# Patient Record
Sex: MALE | Race: WHITE | Employment: FULL TIME | URBAN - METROPOLITAN AREA
[De-identification: names, ages, dates, MRNs, and addresses within clinical notes are randomized per-mention and may not be internally consistent; named-entity substitution may affect disease eponyms.]

---

## 2017-08-18 ENCOUNTER — HOSPITAL ENCOUNTER (OUTPATIENT)
Age: 57
Setting detail: SPECIMEN
Discharge: HOME | End: 2017-08-18
Payer: COMMERCIAL

## 2017-08-22 LAB — SURGICAL PATHOLOGY REPORT: NORMAL

## 2017-10-10 ENCOUNTER — HOSPITAL ENCOUNTER (OUTPATIENT)
Dept: PHYSICAL THERAPY | Facility: CLINIC | Age: 57
Setting detail: THERAPIES SERIES
Discharge: HOME | End: 2017-10-10
Payer: COMMERCIAL

## 2017-10-10 PROCEDURE — 97161 PT EVAL LOW COMPLEX 20 MIN: CPT

## 2017-10-10 PROCEDURE — 97110 THERAPEUTIC EXERCISES: CPT

## 2017-10-10 NOTE — CONSULTS
[] Hari Malave        Outpatient Physical                Therapy       [address]       Phone: [phone]       Fax: [phone] [] [facility] at [address] [facility]       Phone: [phone]       Fax: [phone] [x] Alo. [address] [place] [address]    [address] [place] [place] [address]     Phone: [phone]     Fax:  [phone]     Physical Therapy Spine Evaluation    Date:  10/10/2017  Patient: Jennie Taylor  : 1960  MRN: [id number]  Physician: Keane Holter  Insurance: [facility]  Medical Diagnosis: Dorsalgia, Backache   Rehab Codes: M54.9  Onset Date: 17  Next 's appt.: None at this time    Subjective:   CC: Patient states that on 17 patient was at work when he lifted a 50 lb panel and felt pain posterior, right upper quadrant. He continues to work full time with no restrictions. Patient went to see primary physician a week later on 10/4/17  and was prescribed mm relaxer (patient reports Tizanadine), taken PRN. Patient does report tick bite July 3, 2017, labs taken to r/o Lymes disease, results pending.       PMHx: [] Unremarkable [] Diabetes [] HTN  [] Pacemaker   [] MI/Heart Problems [] Cancer [] Arthritis [x] Other: heart murmur, kidney cyst, mild OA hips, tennis elbow right              [] Refer to full medical chart  In EPIC   Tests: [] X-Ray: [] MRI:  [] Other:    Medications: [x] Refer to full medical record [] None [] Other:  Allergies:      [x] Refer to full medical record [] None [] Other:    Function:  Hand Dominance  [x] Right  [] Left  Working:  [x] Normal Duty  [] Light Duty  [] Off D/T Condition  [] Retired  [] Not Employed                  []  Disability  [] Other:           Return to work:   Job/ADL Description:  Pain:  [x] Yes  [] No Location: Right thoracic spine and covering scapula   Pain Rating: (0-10 scale)2-4/10  Pain altered Tx:  [] Yes  [] No  Action:  Symptoms:  [x] previously given. Treatment Plan:  [x] Therapeutic Exercise    [] Modalities:  [] Therapeutic Activity    [] Ultrasound  [] Electrical Stimulation  [] Gait Training     [x]Massage       [] Lumbar/Cervical Traction  [] Neuromuscular Re-education [] Cold/hotpack [] Iontophoresis: 4 mg/mL  [x] Instruction in HEP             Dexamethasone Sodium  [] Manual Therapy             Phosphate 40-80 mAmin  [] Aquatic Therapy   [] Other:    []  Medication allergies reviewed for use of    Dexamethasone Sodium Phosphate 4mg/ml     with iontophoresis treatments. Pt is not allergic. Frequency:  1 x/week 2 weeks for total 2 follow up visits    Todays Treatment:  Modalities: None  Precautions:  NA  Exercises:  Exercise Reps/ Time Weight/ Level Comments        ER  1 x 10 Red Tband Issued IE 10/10/17        Horizontal ABD 1 x10 Red Tband         Prone abd 90 with ER 10          Chin retraction 10   3 sec hold        Cervical AROM  10  Rotation c chin retraction        Levator Stretch   30 sec hold x 3        UT stretch   30 sec hold x 3        Pec corner stretch   30 sec hold x 3                              Other:  Postural education and cues to maintain posture with sitting and standing therex    Specific Instructions for next treatment:    Treatment Charges: Mins Units   [] Evaluation       [x]  Low       []  Moderate       []  High 40 1   []  Modalities     []  Ther Exercise 25 2   []  Manual Therapy     []  Ther Activities     []  Aquatics     []  Vasocompression     []  Other       TOTAL TREATMENT TIME: 65 minutes    Time in:1100      Time out: 1205    Electronically signed by: Venecia Davies PT        Physician Signature:________________________________Date:__________________  By signing above or cosigning this note, I have reviewed this plan of care and certify a need for medically necessary rehabilitation services.      *PLEASE SIGN ABOVE AND FAX BACK ALL PAGES*

## 2017-10-20 ENCOUNTER — HOSPITAL ENCOUNTER (OUTPATIENT)
Dept: PHYSICAL THERAPY | Facility: CLINIC | Age: 57
Setting detail: THERAPIES SERIES
Discharge: HOME | End: 2017-10-20
Payer: COMMERCIAL

## 2017-10-20 PROCEDURE — 97110 THERAPEUTIC EXERCISES: CPT

## 2017-10-20 NOTE — FLOWSHEET NOTE
[x] Alo. [address] [facility] [employer]  [address] [place] [address]   Phone: [phone]   Fax:  [phone]     Physical Therapy Daily Treatment Note    Date:  10/20/2017  Patient Name:  Noemi Chapa    :  1960  MRN: [id number]  Physician: Lucinda Ortega                                 Insurance: [facility]  Medical Diagnosis: Dorsalgia, Backache                                     Rehab Codes: M54.9  Onset Date: 17                                   Next 's appt.: None at this time    Visit# / total visits: 2/  Cancels/No Shows:     Subjective:    Pain:  [x] Yes  [] No Location: Neck  Pain Rating: (0-10 scale) 0/10  Pain altered Tx:  [x] No  [] Yes  Action:  Comments: Pt arrived noting cont soreness and stiffness, mostly settled in the neck. Also notes difficulty lifting R LE at times, mostly in the morning. Objective:  Modalities:   Precautions:  NA  Exercises:  Exercise Reps/ Time Weight/ Level Comments   Aerodyne 5'           Prone      Retraction 20x     Depression 20x                 ER  2x10 Red    Horizontal ABD 2x10 Red    Ext 2x10 Red    Rows 2x10 Red    Prone abd 90 with ER 10       Scap Squeezes 10x10\"     Chin retraction 10   3 sec hold   Cervical AROM  10   Rotation c chin retraction   Levator Stretch     30 sec hold x 3   UT stretch     30 sec hold x 3   Pec corner stretch     30 sec hold x 3                       Other: Postural education and cues to maintain posture with sitting and standing therex    Specific Instructions for next treatment:    Treatment Charges: Mins Units   []  Modalities     [x]  Ther Exercise 30 2   []  Manual Therapy     []  Ther Activities     []  Aquatics     []  Vasocompression     []  Other     Total Treatment time 45 2       Assessment: [x] Progressing toward goals. [] No change. [x] Other: Cont with exs per log, cont to stress importance of proper posture. Sig tightness noted with UT stretch and cervical rotation.  Progressed strengthening

## 2017-10-27 ENCOUNTER — HOSPITAL ENCOUNTER (OUTPATIENT)
Dept: PHYSICAL THERAPY | Facility: CLINIC | Age: 57
Setting detail: THERAPIES SERIES
Discharge: HOME | End: 2017-10-27
Payer: COMMERCIAL

## 2017-10-30 ENCOUNTER — HOSPITAL ENCOUNTER (OUTPATIENT)
Dept: PHYSICAL THERAPY | Facility: CLINIC | Age: 57
Setting detail: THERAPIES SERIES
Discharge: HOME | End: 2017-10-30
Payer: COMMERCIAL

## 2017-10-30 PROCEDURE — 97110 THERAPEUTIC EXERCISES: CPT

## 2017-10-30 NOTE — FLOWSHEET NOTE
[x] [facility]. [address] [place] [address] [email or web address]  [address] [place] [address]   Phone: [phone]   Fax:  [phone]     Physical Therapy Daily Treatment Note    Date:  10/30/2017  Patient Name:  Flaca Maxwell    :  1960  MRN: [id number]  Physician: Margarito Hudson                                 Insurance: [facility]  Medical Diagnosis: Dorsalgia, Backache                                     Rehab Codes: M54.9  Onset Date: 17                                   Next 's appt.: None at this time    Visit# / total visits: 3/  Cancels/No Shows:     Subjective:    Pain:  [] Yes  [x] No Location: Neck  Pain Rating: (0-10 scale) 0/10  Pain altered Tx:  [x] No  [] Yes  Action:  Comments: Pt arrived noting no c/o pn/soreness just cont stiffness. Pt requested demonstration of exercise machines that would be at gym. Pt with no gym membership at this time but plans on joining soon. Objective:  Modalities:   Precautions:  NA  Exercises:  Exercise Reps/ Time Weight/ Level Comments   Aerodyne 5' x          Prone      Retraction 20x     Depression 20x                 ER  2x10 green x    Horizontal ABD 2x10 Green x    Ext 2x10 Green x    Rows 2x10 Green x    Prone abd 90 with ER 10       Scap Squeezes 10x10\" x    Chin retraction 10  x 3 sec hold   Cervical AROM  10   Rotation c chin retraction   Levator Stretch    x 30 sec hold x 3   UT stretch    x 30 sec hold x 3   Pec corner stretch    x 30 sec hold x 3                       Other: Postural education and cues to maintain posture with sitting and standing therex. Instructed pt how to utilize various exercise equipment in the gym for use outside of clinic. Specific Instructions for next treatment:    Treatment Charges: Mins Units   []  Modalities     [x]  Ther Exercise 30 2   []  Manual Therapy     []  Ther Activities     []  Aquatics     []  Vasocompression     []  Other     Total Treatment time 45 2       Assessment: [x] Progressing toward goals.     [] No change. [x] Other: Cont with exs per log, progressed to green tband this visit with increased fatigue and min cueing needed for tech. Reviewed UE machine for gym use with good comprehension. Pt plans to continue with HEP at this time. Reviewed program, issued additional tband for HEP. STG: (to be met in 2 follow up visits/ treatments)  1. ? Pain:  Patient reports pain 1/10  2. ? ROM:  Patient cervical AROM WNL  3. ? Strength:  ER 5/5  4. ? Function:  Oswestry = 24%   5. Independent with Home Exercise Programs  6. Demonstrate Knowledge of fall prevention      Patient goals:  Strengthen back muscles. Pt. Education:  [x] Yes  [] No  [x] Reviewed Prior HEP/Ed  Method of Education: [x] Verbal  [] Demo  [x] Written  Comprehension of Education:  [x] Verbalizes understanding. [] Demonstrates understanding. [x] Needs review. [] Demonstrates/verbalizes HEP/Ed previously given. Plan: [x] Continue per plan of care.    [] Other:      Time In: 1400            Time Out: 5398    Electronically signed by:  Faye Copeland PTA

## 2017-11-07 ENCOUNTER — HOSPITAL ENCOUNTER (EMERGENCY)
Age: 57
Discharge: HOME | End: 2017-11-07
Attending: SPECIALIST
Payer: COMMERCIAL

## 2017-11-07 VITALS
RESPIRATION RATE: 16 BRPM | HEART RATE: 64 BPM | SYSTOLIC BLOOD PRESSURE: 131 MMHG | OXYGEN SATURATION: 98 % | DIASTOLIC BLOOD PRESSURE: 83 MMHG | TEMPERATURE: 97.7 F

## 2017-11-07 DIAGNOSIS — S01.01XA LACERATION OF SCALP, INITIAL ENCOUNTER: Primary | ICD-10-CM

## 2017-11-07 DIAGNOSIS — S09.90XA CLOSED HEAD INJURY, INITIAL ENCOUNTER: ICD-10-CM

## 2017-11-07 PROCEDURE — 99283 EMERGENCY DEPT VISIT LOW MDM: CPT

## 2017-11-07 PROCEDURE — 12001 RPR S/N/AX/GEN/TRNK 2.5CM/<: CPT

## 2017-11-07 PROCEDURE — 2500000003 HC RX 250 WO HCPCS: Performed by: SPECIALIST

## 2017-11-07 RX ORDER — LIDOCAINE HYDROCHLORIDE 10 MG/ML
20 INJECTION, SOLUTION INFILTRATION; PERINEURAL ONCE
Status: COMPLETED | OUTPATIENT
Start: 2017-11-07 | End: 2017-11-07

## 2017-11-07 RX ADMIN — LIDOCAINE HYDROCHLORIDE 20 ML: 10 INJECTION, SOLUTION INFILTRATION; PERINEURAL at 15:24

## 2017-11-07 ASSESSMENT — PAIN SCALES - GENERAL
PAINLEVEL_OUTOF10: 1
PAINLEVEL_OUTOF10: 1

## 2017-11-07 ASSESSMENT — PAIN DESCRIPTION - ONSET: ONSET: SUDDEN

## 2017-11-07 ASSESSMENT — PAIN DESCRIPTION - FREQUENCY: FREQUENCY: CONTINUOUS

## 2017-11-07 ASSESSMENT — PAIN DESCRIPTION - DESCRIPTORS: DESCRIPTORS: ACHING

## 2017-11-07 ASSESSMENT — ENCOUNTER SYMPTOMS: NAUSEA: 0

## 2017-11-07 ASSESSMENT — PAIN DESCRIPTION - PAIN TYPE: TYPE: ACUTE PAIN

## 2017-11-07 ASSESSMENT — PAIN DESCRIPTION - LOCATION: LOCATION: HEAD

## 2017-11-07 NOTE — ED PROVIDER NOTES
[facility]  eMERGENCY dEPARTMENT eNCOUnter      Pt Name: Ilda Damian  MRN: [id number]  [place] 1960  Date of evaluation: 11/7/17      CHIEF COMPLAINT       Chief Complaint   Patient presents with    Head Injury     Patient was hit in the head with a cast iron pipe at work today. No LOC         HISTORY OF PRESENT ILLNESS    Ilda Damian is a 61 y.o. male who presents to the emergency department after patient sustained a scalp laceration on the top of the scalp at about 120 p.m. this afternoon. Patient states he was helping a coworker when a cast iron pipe fell from approximately 6 feet height causing laceration on the top of the scalp today. Nose to loss of consciousness. The iron pipe vague about 2-3 pounds. No history of loss of consciousness and patient denies any headache, dizziness, neck pain, nausea, visual disturbances, tingling, numbness or weakness in any of the extremities. Last tetanus injection is up-to-date. He denies any other injuries. Patient does not take any blood thinners. REVIEW OF SYSTEMS       Review of Systems   Gastrointestinal: Negative for nausea. Musculoskeletal: Negative for neck pain. Skin: Positive for wound. Neurological: Negative for dizziness, weakness, numbness and headaches. All other systems reviewed and are negative. PAST MEDICAL HISTORY    has a past medical history of Head injury. SURGICAL HISTORY      has a past surgical history that includes Tonsillectomy. CURRENT MEDICATIONS       Previous Medications    ASPIRIN PO    Take  by mouth. ALLERGIES     has No Known Allergies. FAMILY HISTORY     has no family status information on file. family history is not on file. SOCIAL HISTORY      reports that he has never smoked. He does not have any smokeless tobacco history on file. He reports that he drinks alcohol. He reports that he does not use drugs.     PHYSICAL EXAM     INITIAL VITALS:  oral temperature is 97.7 °F Temp: 97.7 °F (36.5 °C), Pulse: 64, Resp: 16    Orders Placed This Encounter   Medications    lidocaine 1 % injection 20 mL         During emergency department course, the scalp laceration was stapled by myself. Patient tolerated the procedure well. Please see procedure note. Plan is to discharge the patient with wound care and head injury instructions, staples removal in 5-7 days, take Tylenol and/or ibuprofen as needed, follow up with PCP, return if worse. CONSULTS:  None    PROCEDURES:  Laceration Repair Procedure Note    Indication: Laceration    Procedure: The patient was placed in the appropriate position and anesthesia around the laceration was obtained by infiltration using 1% Lidocaine without epinephrine. The area was then cleansed with betadine and draped in a sterile fashion. The laceration was closed with staples. There were no additional lacerations requiring repair. The wound area was then dressed with bacitracin. Total repaired wound length: 2.5 cm. Other Items: Staple count: 5    The patient tolerated the procedure well. Complications: None        FINAL IMPRESSION      1. Laceration of scalp, initial encounter    2. Closed head injury, initial encounter          DISPOSITION/PLAN       PATIENT REFERRED TO:  [address]  [address]  [phone]    Call in 5 days  For staples removal    [facility] ED  [address] [place] [address]  [phone]    If symptoms worsen      DISCHARGE MEDICATIONS:  New Prescriptions    No medications on file       (Please note that portions of this note were completed with a voice recognition program.  Efforts were made to edit the dictations but occasionally words are mis-transcribed.)    Ramirez MD, F.A.C.E.P.   Attending Emergency Medicine Physician         Kwesi Rosado MD  11/07/17 0876

## 2017-11-13 ENCOUNTER — HOSPITAL ENCOUNTER (EMERGENCY)
Age: 57
Discharge: HOME | End: 2017-11-13
Attending: EMERGENCY MEDICINE
Payer: COMMERCIAL

## 2017-11-13 VITALS
TEMPERATURE: 97.9 F | HEART RATE: 63 BPM | RESPIRATION RATE: 15 BRPM | BODY MASS INDEX: 22.15 KG/M2 | SYSTOLIC BLOOD PRESSURE: 133 MMHG | OXYGEN SATURATION: 100 % | WEIGHT: 150 LBS | DIASTOLIC BLOOD PRESSURE: 73 MMHG

## 2017-11-13 DIAGNOSIS — Z48.02: Primary | ICD-10-CM

## 2017-11-13 PROCEDURE — 99282 EMERGENCY DEPT VISIT SF MDM: CPT

## 2017-11-13 NOTE — ED PROVIDER NOTES
[facility] ED  [address] [facility] [address]  Phone: [phone]  Fax: [phone]      eMERGENCY dEPARTMENT eNCOUnter      Pt Name: Arabella Johnson  MRN: [id number]  [place] 1960  Date of evaluation: 11/13/17      CHIEF COMPLAINT:  Chief Complaint   Patient presents with    Suture / Staple Removal     5 staples placed last Tuesday       HISTORY OF PRESENT ILLNESS    Arabella Johnson is a 61 y.o. male who presents for SUTURE AND/OR STAPLE REMOVAL:    Location/Symptom:    Staple removal evaluation  Timing/Onset:   6 days  Context/Setting:   Staple removal after large metal pipe elbow hit his head. Alexander placed here w/o complication and he denies any concerns since their placement. No other complaints. Quality:   No pain  Duration:   resolved  Modifying Factors:   none  Severity:   none    Nursing Notes were reviewed. REVIEW OF SYSTEMS       Constitutional: Denies recent fever, chills. Eyes: No visual changes. Neck: No neck pain. Respiratory: Denies recent shortness of breath. Cardiac:  Denies recent chest pain. GI:  No nausea. No vomiting. Denies abdominal pain/diarrhea. Musculoskeletal: Denies focal weakness. Neurologic:  Denies headache or focal weakness. Skin:   Suture/staple removal    Negative in 10 essential Systems except as mentioned above and in the HPI. PAST MEDICAL HISTORY   PMH:  has a past medical history of Head injury. Surgical History:  has a past surgical history that includes Tonsillectomy. Social History:  reports that he has never smoked. He has never used smokeless tobacco. He reports that he drinks alcohol. He reports that he does not use drugs. Family History: None  Psychiatric History: None    Allergies:has No Known Allergies.       PHYSICAL EXAM     INITIAL VITALS: /73   Pulse 63   Temp 97.9 °F (36.6 °C) (Oral)   Resp 15   Wt 68 kg (150 lb)   SpO2 100%   BMI 22.15 kg/m²     Constitutional:  Well developed   Eyes: Pupils equal  HENT:  Atraumatic, left parietal scalp laceration with 5 staples in place, c/d/i and well-healed. external ears normal, nose normal  Respiratory:  Clear to auscultation bilaterally with good air exchange  Cardiovascular:  RRR with normal S1 and S2  Musculoskeletal:  Normal to inspection  Integument:   As above  Neurologic:  Alert & oriented x 3, no focal deficits noted       DIAGNOSTIC RESULTS     EKG: All EKG's are interpreted by the Emergency Department Physician who either signs or Co-signs this chart in the absence of a cardiologist.  Not indicated    RADIOLOGY:   Reviewed the radiologist:  No orders to display     Not indicated      LABS:  Labs Reviewed - No data to display  Not indicated    EMERGENCY DEPARTMENT COURSE:         No orders of the defined types were placed in this encounter. CONSULTS:  None      Suture/ Staple Removal Procedure Note  Indication: Wound healed    Procedure: The patient was placed in the appropriate position and the staples were removed without difficulty. Other items: the wound appears well healed and Staple count: 5    The patient tolerated the procedure well. Complications: None      FINAL IMPRESSION      1.  Encounter for removal of staples          DISPOSITION/PLAN:  DISPOSITION Decision to Discharge      PATIENT REFERRED TO:  Toro Barba , [address]  [address]  [phone]    Go to   for persistence or worsening of symptoms      DISCHARGE MEDICATIONS:  New Prescriptions    No medications on file       (Please note that portions of this note were completed with a voice recognition program.  Efforts were made to edit the dictations but occasionally words are mis-transcribed.)    ABEL Pizarro PA-C  11/13/17 9172

## 2017-11-13 NOTE — ED NOTES
Patient into ER for staple removal that was placed last Tuesday. Denies any additional problems, complaints or complications  CBE=70  A&0 X4  X5 stapled noted, no redness, inflammation, drainage or discharge.     Ambulatory to room   Nondistressed  GCS=15  VS stable    Call light within reach  Updated on plan of care and processes  Denies complaints at this time  Will continue to monitor       Sonia Murcia RN  11/13/17 0823

## 2017-11-13 NOTE — ED PROVIDER NOTES
smoked. He has never used smokeless tobacco. He reports that he drinks alcohol. He reports that he does not use drugs. PHYSICAL EXAM     INITIAL VITALS:  weight is 68 kg (150 lb). His oral temperature is 97.9 °F (36.6 °C). His blood pressure is 133/73 and his pulse is 63. His respiration is 15 and oxygen saturation is 100%. Healing scalp wound with staples in place. No evidence of infection. DIAGNOSTIC RESULTS       EMERGENCY DEPARTMENT COURSE:   Vitals:    Vitals:    11/13/17 1313   BP: 133/73   Pulse: 63   Resp: 15   Temp: 97.9 °F (36.6 °C)   TempSrc: Oral   SpO2: 100%   Weight: 68 kg (150 lb)     -------------------------  BP: 133/73, Temp: 97.9 °F (36.6 °C), Pulse: 63, Resp: 15      PERTINENT ATTENDING PHYSICIAN COMMENTS:    Staples were removed by the PA. He tolerated it well. He has been given supportive care infections will be discharged. (Please note that portions of this note were completed with a voice recognition program.  Efforts were made to edit the dictations but occasionally words are mis-transcribed.)    Santos MD, F.A.C.E.P.   Attending Emergency Medicine Physician        Ganesh Bradley MD  11/13/17 8914

## 2018-01-10 ENCOUNTER — INITIAL CONSULT (OUTPATIENT)
Dept: ONCOLOGY | Age: 58
End: 2018-01-10
Payer: COMMERCIAL

## 2018-01-10 VITALS
HEART RATE: 62 BPM | DIASTOLIC BLOOD PRESSURE: 82 MMHG | BODY MASS INDEX: 22.19 KG/M2 | SYSTOLIC BLOOD PRESSURE: 126 MMHG | RESPIRATION RATE: 16 BRPM | HEIGHT: 69 IN | TEMPERATURE: 98.6 F | WEIGHT: 149.8 LBS

## 2018-01-10 DIAGNOSIS — D64.9 ANEMIA, UNSPECIFIED TYPE: Primary | ICD-10-CM

## 2018-01-10 PROCEDURE — 99201 HC NEW PT, E/M LEVEL 1: CPT

## 2018-01-10 PROCEDURE — 99204 OFFICE O/P NEW MOD 45 MIN: CPT | Performed by: INTERNAL MEDICINE

## 2018-01-10 RX ORDER — MESALAMINE 1.2 G/1
TABLET, DELAYED RELEASE ORAL
COMMUNITY
Start: 2017-12-14 | End: 2018-02-07 | Stop reason: ALTCHOICE

## 2018-01-10 RX ORDER — INDOMETHACIN 50 MG/1
50 CAPSULE ORAL 2 TIMES DAILY WITH MEALS
COMMUNITY
End: 2022-10-17

## 2018-01-10 RX ORDER — TIZANIDINE 4 MG/1
TABLET ORAL
COMMUNITY
Start: 2017-10-04 | End: 2018-02-07 | Stop reason: ALTCHOICE

## 2018-01-10 NOTE — PROGRESS NOTES
processed at  [facility], [address] [facility] [place] are 6 tan-brown  fragments ranging from 0.2 to 0.4 cm in greatest dimension and 1.7 x  0.2 x 0.2 cm in aggregate. Entirely submitted 1cs. Received at [facility]  are 2 slides labeled \"[id number], MARLON TORRES. \"  tm      Microscopic Description  1. Sections of duodenal mucosa show preservation of the villous  architecture. The lamina propria contains the usual lymphocytes and  plasma cells. There is no evidence of neutrophilic inflammation,  granuloma formation or dysplasia. 2. Sections of gastric mucosa show neutrophils in the lamina propria  and gastric pits. There is  a background lymphocytic and plasma cell  infiltrate. There is no evidence of intestinal metaplasia or  dysplasia. The provided Giemsa stain appears negative for  Helicobacter organisms (control stains appropriately). 3.  Sections of colonic mucosa show irregular crypt spacing and a  brisk lymphoplasmacytic infiltrate in the lamina propria. Occasional  neutrophils are present in the lamina propria but there is no evidence  of well-developed crypt abscess formation. The provided trichrome  stain requested by Dr. Danuta Garcia and performed at [address] lab  appears negative for a thickened subepithelial collagen band. Controls are adequate. 4. Sections of colonic mucosa show slightly irregularly spaced glands  with increased lymphocytes and plasma cells in the lamina propria. Scattered neutrophils and eosinophils are found in the lamina propria  and focally in the surface epithelium. There is no evidence of  well-developed crypt abscess formation. Focal mucin extravasation   with muciphages are noted, but true granulomas are not identified. The trichrome stain requested by Dr. Danuta Garcia and performed at [address] lab appears negative for thickened subepithelial collagen  band. Controls stain appropriately. IMPRESSION:     1.  Anemia, unspecified type arthralgias injection site tenderness superficial thrombophlebitis it's extremely rare to have an extravasation which results in permanent hyperpigmentation of the skin I've seen 3/17 approximately years a practice. Gen. iron is very successful at completing iron state. Given new diagnosis of colitis I will order M05, folic acid, zinc copper vitamin D which are common to be reduced and in the malabsorption/colitis type inflammatory state. [place] on the order today as a baseline as well. I like to monitor CBC and ferritin acute 2 week intervals. I've given him approximately a 6 week follow-up. He understands that always in the differential of anemia includes the potential for hemolysis, distribution will causation such as hepatosplenomegaly, or really concerning is always the potential for a primary bone marrow process in that case a bone marrow biopsy particular if he were to fail to respond and become nonanemic hemoglobin was told not to improve 2 levels that are normal than certainly further evaluation to include potential bone marrow biopsy would consider but this seems highly unlikely scenario given the above clinical known issues. All questions were answered to his satisfaction. Thank you Dr. Taylor Harrell for allowing me to consult and participate in this very nice gentleman's care. Herlinda Boss D.O.       Electronically signed by Herlinda Boss DO on 1/10/2018 at 6:00 PM

## 2018-02-07 ENCOUNTER — OFFICE VISIT (OUTPATIENT)
Dept: ONCOLOGY | Age: 58
End: 2018-02-07
Payer: COMMERCIAL

## 2018-02-07 VITALS
DIASTOLIC BLOOD PRESSURE: 89 MMHG | BODY MASS INDEX: 22.28 KG/M2 | TEMPERATURE: 97.4 F | SYSTOLIC BLOOD PRESSURE: 134 MMHG | HEART RATE: 59 BPM | WEIGHT: 150.9 LBS | RESPIRATION RATE: 16 BRPM

## 2018-02-07 DIAGNOSIS — D64.9 ANEMIA, UNSPECIFIED TYPE: ICD-10-CM

## 2018-02-07 PROCEDURE — 99214 OFFICE O/P EST MOD 30 MIN: CPT | Performed by: INTERNAL MEDICINE

## 2018-02-07 PROCEDURE — 99211 OFF/OP EST MAY X REQ PHY/QHP: CPT

## 2018-02-07 NOTE — PROGRESS NOTES
PROGRESS NOTE    PCP: Sammy Isbell MD  Referring Provider: No ref. provider found    VISIT DIAGNOSIS:  The encounter diagnosis was Anemia, unspecified type. PAST MEDICAL HISTORY:      Diagnosis Date    Anemia     Head injury        PAST SURGICAL HISTORY:      Procedure Laterality Date    CYST REMOVAL      TONSILLECTOMY      VASECTOMY         CURRENT MEDICATIONS:   Current Outpatient Prescriptions   Medication Sig Dispense Refill    indomethacin (INDOCIN) 50 MG capsule Take 50 mg by mouth 2 times daily (with meals)       Multiple Vitamins-Minerals (THERAPEUTIC VITAMINS/MINERALS PO) Take by mouth 1 every other day      ASPIRIN PO Take by mouth        No current facility-administered medications for this visit. SUBJECTIVE:  Huyen Tiwari is a very pleasant 61 y.o. male who is Here for continued evaluation regard to iron deficiency anemia. Feels dramatically better. Specifically endurance energy. He notes that he has some rare diarrhea and abdominal pain and discomfort. Describes being change from mesalamine to Pentasa. He's tolerated both per poorly and probably the Pentasa the worst.  He has upcoming additional appointment with GI but this is an middle March. He notes no ongoing bleeding or easy bruisability. Specifically no epistaxis, blood in stools black or tarry stools hematuria hemoptysis or other that he is aware of. Denies fevers chills night sweats. He notes no chest pain orthopnea PND peripheral edema. He has a pill bottle of oral iron 325 mg ferrous sulfate with them and he is taking 1 pill daily. PHYSICAL EXAM:   Vitals:    02/07/18 1411   BP: 134/89   Pulse: 59   Resp: 16   Temp: 97.4 °F (36.3 °C)       Physical Exam   Constitutional: He is oriented to person, place, and time. He appears well-developed. No distress. HENT:   Head: Normocephalic and atraumatic.    Nose: Nose normal.   Mouth/Throat: Oropharynx is clear and moist.   Eyes: Conjunctivae are normal. Pupils COULD FIT THE ENDOSCOPIC PICTURE. Yaritza Coelho M.D.  **Electronically Signed Out**         jet/8/22/2 017      Clinical Information  Pre-op Diagnosis:  ANEMIA, R/O CELIAC   Operative Findings:  DUODENAL BX; GASTRIC BX; RANDOM COLON BX RIGHT;  LEFT COLON  Clin. Req.:  H&E; GIEMSA; TRICHROME    Source of Specimen  1: DUODENAL BX  2: GASTRIC BX  3: RANDOM COLON BX  4: LEFT COLON    Gross Description  1. \"DUODENAL BX\" Received in formalin, submitted and processed at [address], [facility] are 5 tan-brown  fragments ranging from 0.2 to 0.6 cm in greatest dimension and 1.9 x  0.2 x 0.2 cm in aggregate. Entirely submitted 1cs. Received at [facility]  is 1 slide labeled \"[id number], Nicholas rUban. \"  2. \"GASTRIC BX\" Received in formalin, submitted and processed at [address] are 4 tan-brown  fragments ranging from 0.2 to 0.4 cm in greatest dimension and 1.2 x  0.3 x 0.2 cm in aggregate. Entirely submitted 1cs. Received at [facility]  are 2 slides labeled \"[id number], Nicholas Urban. \"  3. \"RANDOM COLON RIGHT\" Received in for trip, submitted and processed  at [address] are 6 tan-brown  fragments ranging from 0.2 to 0.3 cm in greatest dimension and 1.5 x  0.2 x 0.2 cm in aggregate. Entirely submitted 1cs. Received at [facility]  are 2 slides labeled \"[id number], Velflores Urban. \"  4. \"LEFT COLON BX\" Received in formalin, submitted and processed at  [address] are 6 tan-brown  fragments ranging from 0.2 to 0.4 cm in greatest dimension and 1.7 x  0.2 x 0.2 cm in aggregate. Entirely submitted 1cs. Received at [facility]  are 2 slides labeled \"[id number], MARLON TORRES. \"  tm      Microscopic Description  1. Sections of duodenal mucosa show preservation of the villous  architecture. The lamina propria contains the usual lymphocytes and  plasma cells.   There is no evidence of I've discussed that I checked zinc, copper, J78, folic acid, and vitamin D as these can be low additionally with malabsorption issues. These were all normal levels. He understands that he likely has grown/ulcerative colitis -type picture that he's being treated for by GI. I recommended surveillance of iron stores and CBC at monthly intervals and a three-month follow-up. Recommended he stay on the oral iron ferrous sulfate 325 mg 1 by mouth daily at this time. Plan to see him back in 3 months. As always have discussed that if he should have noticed any shortness of breath, chest pain, blood in stools black or tarry stools drop in energy or other ongoing issues or concerns that he should certainly contact myself or Dr. Alyx Domínguez in further workup would be expedited. He verbalized understanding and was agreeable to the plan of care.       Electronically signed by Domingo Adhikari DO on 2/7/2018 at 5:36 PM

## 2018-03-02 ENCOUNTER — HOSPITAL ENCOUNTER (OUTPATIENT)
Age: 58
Discharge: HOME | End: 2018-03-02
Payer: COMMERCIAL

## 2018-03-02 DIAGNOSIS — D64.9 ANEMIA, UNSPECIFIED TYPE: ICD-10-CM

## 2018-03-02 LAB
FERRITIN: 29 UG/L (ref 30–400)
HCT VFR BLD CALC: 41 % (ref 41–53)
HEMOGLOBIN: 13.3 G/DL (ref 13.5–17.5)
MCH RBC QN AUTO: 28.2 PG (ref 26–34)
MCHC RBC AUTO-ENTMCNC: 32.4 G/DL (ref 31–37)
MCV RBC AUTO: 87.2 FL (ref 80–100)
NRBC AUTOMATED: ABNORMAL PER 100 WBC
PDW BLD-RTO: 14.8 % (ref 12.5–15.4)
PLATELET # BLD: 266 K/UL (ref 140–450)
PMV BLD AUTO: 8.7 FL (ref 6–12)
PROSTATE SPECIFIC ANTIGEN: 0.79 UG/L
RBC # BLD: 4.7 M/UL (ref 4.5–5.9)
WBC # BLD: 6.7 K/UL (ref 3.5–11)

## 2018-03-02 PROCEDURE — 82728 ASSAY OF FERRITIN: CPT

## 2018-03-02 PROCEDURE — 36415 COLL VENOUS BLD VENIPUNCTURE: CPT

## 2018-03-02 PROCEDURE — 85027 COMPLETE CBC AUTOMATED: CPT

## 2018-03-02 PROCEDURE — 84153 ASSAY OF PSA TOTAL: CPT

## 2018-04-06 ENCOUNTER — HOSPITAL ENCOUNTER (OUTPATIENT)
Age: 58
Discharge: HOME | End: 2018-04-06
Payer: COMMERCIAL

## 2018-04-06 DIAGNOSIS — D64.9 ANEMIA, UNSPECIFIED TYPE: ICD-10-CM

## 2018-04-06 LAB
FERRITIN: 29 UG/L (ref 30–400)
HCT VFR BLD CALC: 41.3 % (ref 41–53)
HEMOGLOBIN: 13.7 G/DL (ref 13.5–17.5)
MCH RBC QN AUTO: 29 PG (ref 26–34)
MCHC RBC AUTO-ENTMCNC: 33.2 G/DL (ref 31–37)
MCV RBC AUTO: 87.2 FL (ref 80–100)
NRBC AUTOMATED: NORMAL PER 100 WBC
PDW BLD-RTO: 15 % (ref 12.5–15.4)
PLATELET # BLD: 267 K/UL (ref 140–450)
PMV BLD AUTO: 8.6 FL (ref 6–12)
RBC # BLD: 4.74 M/UL (ref 4.5–5.9)
WBC # BLD: 7.9 K/UL (ref 3.5–11)

## 2018-04-06 PROCEDURE — 85027 COMPLETE CBC AUTOMATED: CPT

## 2018-04-06 PROCEDURE — 36415 COLL VENOUS BLD VENIPUNCTURE: CPT

## 2018-04-06 PROCEDURE — 82728 ASSAY OF FERRITIN: CPT

## 2018-05-07 ENCOUNTER — HOSPITAL ENCOUNTER (OUTPATIENT)
Age: 58
Discharge: HOME | End: 2018-05-07
Payer: COMMERCIAL

## 2018-05-07 DIAGNOSIS — D64.9 ANEMIA, UNSPECIFIED TYPE: ICD-10-CM

## 2018-05-07 LAB
FERRITIN: 37 UG/L (ref 30–400)
HCT VFR BLD CALC: 44.4 % (ref 41–53)
HEMOGLOBIN: 14.6 G/DL (ref 13.5–17.5)
MCH RBC QN AUTO: 28.8 PG (ref 26–34)
MCHC RBC AUTO-ENTMCNC: 32.9 G/DL (ref 31–37)
MCV RBC AUTO: 87.7 FL (ref 80–100)
NRBC AUTOMATED: ABNORMAL PER 100 WBC
PDW BLD-RTO: 15.5 % (ref 12.5–15.4)
PLATELET # BLD: 244 K/UL (ref 140–450)
PMV BLD AUTO: 8.9 FL (ref 6–12)
RBC # BLD: 5.06 M/UL (ref 4.5–5.9)
WBC # BLD: 7.9 K/UL (ref 3.5–11)

## 2018-05-07 PROCEDURE — 85027 COMPLETE CBC AUTOMATED: CPT

## 2018-05-07 PROCEDURE — 36415 COLL VENOUS BLD VENIPUNCTURE: CPT

## 2018-05-07 PROCEDURE — 82728 ASSAY OF FERRITIN: CPT

## 2018-05-09 ENCOUNTER — OFFICE VISIT (OUTPATIENT)
Dept: ONCOLOGY | Age: 58
End: 2018-05-09
Payer: COMMERCIAL

## 2018-05-09 VITALS
RESPIRATION RATE: 16 BRPM | SYSTOLIC BLOOD PRESSURE: 120 MMHG | TEMPERATURE: 98.2 F | WEIGHT: 151 LBS | BODY MASS INDEX: 22.3 KG/M2 | DIASTOLIC BLOOD PRESSURE: 77 MMHG | HEART RATE: 65 BPM

## 2018-05-09 DIAGNOSIS — D64.9 ANEMIA, UNSPECIFIED TYPE: ICD-10-CM

## 2018-05-09 PROCEDURE — 99213 OFFICE O/P EST LOW 20 MIN: CPT | Performed by: INTERNAL MEDICINE

## 2018-05-09 PROCEDURE — 99211 OFF/OP EST MAY X REQ PHY/QHP: CPT | Performed by: INTERNAL MEDICINE

## 2018-05-09 RX ORDER — FERROUS SULFATE 325(65) MG
325 TABLET ORAL EVERY OTHER DAY
COMMUNITY

## 2018-11-12 ENCOUNTER — HOSPITAL ENCOUNTER (OUTPATIENT)
Age: 58
Discharge: HOME | End: 2018-11-12
Payer: COMMERCIAL

## 2018-11-12 DIAGNOSIS — D64.9 ANEMIA, UNSPECIFIED TYPE: ICD-10-CM

## 2018-11-12 LAB
FERRITIN: 39 UG/L (ref 30–400)
HCT VFR BLD CALC: 42.1 % (ref 41–53)
HEMOGLOBIN: 13.7 G/DL (ref 13.5–17.5)
MCH RBC QN AUTO: 29.4 PG (ref 26–34)
MCHC RBC AUTO-ENTMCNC: 32.5 G/DL (ref 31–37)
MCV RBC AUTO: 90.7 FL (ref 80–100)
NRBC AUTOMATED: NORMAL PER 100 WBC
PDW BLD-RTO: 13.7 % (ref 12.5–15.4)
PLATELET # BLD: 289 K/UL (ref 140–450)
PMV BLD AUTO: 8.6 FL (ref 6–12)
RBC # BLD: 4.64 M/UL (ref 4.5–5.9)
WBC # BLD: 6.3 K/UL (ref 3.5–11)

## 2018-11-12 PROCEDURE — 82525 ASSAY OF COPPER: CPT

## 2018-11-12 PROCEDURE — 82728 ASSAY OF FERRITIN: CPT

## 2018-11-12 PROCEDURE — 84630 ASSAY OF ZINC: CPT

## 2018-11-12 PROCEDURE — 82652 VIT D 1 25-DIHYDROXY: CPT

## 2018-11-12 PROCEDURE — 36415 COLL VENOUS BLD VENIPUNCTURE: CPT

## 2018-11-12 PROCEDURE — 85027 COMPLETE CBC AUTOMATED: CPT

## 2018-11-14 ENCOUNTER — OFFICE VISIT (OUTPATIENT)
Dept: ONCOLOGY | Age: 58
End: 2018-11-14
Payer: COMMERCIAL

## 2018-11-14 VITALS
WEIGHT: 153 LBS | BODY MASS INDEX: 22.59 KG/M2 | HEART RATE: 59 BPM | SYSTOLIC BLOOD PRESSURE: 137 MMHG | DIASTOLIC BLOOD PRESSURE: 88 MMHG | TEMPERATURE: 97.6 F

## 2018-11-14 DIAGNOSIS — K52.9 COLITIS: Primary | ICD-10-CM

## 2018-11-14 PROCEDURE — 99213 OFFICE O/P EST LOW 20 MIN: CPT | Performed by: INTERNAL MEDICINE

## 2018-11-15 LAB — VITAMIN D 1,25-DIHYDROXY: 63.4 PG/ML (ref 19.9–79.3)

## 2018-11-16 LAB
COPPER: 122 UG/DL (ref 70–140)
ZINC: 66 UG/DL (ref 60–120)

## 2019-01-08 ENCOUNTER — HOSPITAL ENCOUNTER (OUTPATIENT)
Age: 59
Discharge: HOME | End: 2019-01-08
Payer: COMMERCIAL

## 2019-01-08 DIAGNOSIS — K52.9 COLITIS: ICD-10-CM

## 2019-01-08 LAB
ABSOLUTE EOS #: 0.1 K/UL (ref 0–0.4)
ABSOLUTE IMMATURE GRANULOCYTE: ABNORMAL K/UL (ref 0–0.3)
ABSOLUTE LYMPH #: 2 K/UL (ref 1–4.8)
ABSOLUTE MONO #: 0.7 K/UL (ref 0.1–1.2)
BASOPHILS # BLD: 0 % (ref 0–2)
BASOPHILS ABSOLUTE: 0 K/UL (ref 0–0.2)
DIFFERENTIAL TYPE: ABNORMAL
EOSINOPHILS RELATIVE PERCENT: 1 % (ref 1–4)
HCT VFR BLD CALC: 40.5 % (ref 41–53)
HEMOGLOBIN: 13.3 G/DL (ref 13.5–17.5)
IMMATURE GRANULOCYTES: ABNORMAL %
LYMPHOCYTES # BLD: 24 % (ref 24–44)
MCH RBC QN AUTO: 28.7 PG (ref 26–34)
MCHC RBC AUTO-ENTMCNC: 32.9 G/DL (ref 31–37)
MCV RBC AUTO: 87.1 FL (ref 80–100)
MONOCYTES # BLD: 9 % (ref 2–11)
NRBC AUTOMATED: ABNORMAL PER 100 WBC
PDW BLD-RTO: 13.9 % (ref 12.5–15.4)
PLATELET # BLD: 319 K/UL (ref 140–450)
PLATELET ESTIMATE: ABNORMAL
PMV BLD AUTO: 8.3 FL (ref 6–12)
RBC # BLD: 4.65 M/UL (ref 4.5–5.9)
RBC # BLD: ABNORMAL 10*6/UL
SEG NEUTROPHILS: 66 % (ref 36–66)
SEGMENTED NEUTROPHILS ABSOLUTE COUNT: 5.3 K/UL (ref 1.8–7.7)
WBC # BLD: 8.1 K/UL (ref 3.5–11)
WBC # BLD: ABNORMAL 10*3/UL

## 2019-01-08 PROCEDURE — 85025 COMPLETE CBC W/AUTO DIFF WBC: CPT

## 2019-01-08 PROCEDURE — 36415 COLL VENOUS BLD VENIPUNCTURE: CPT

## 2019-01-08 PROCEDURE — 82728 ASSAY OF FERRITIN: CPT

## 2019-01-09 LAB — FERRITIN: 32 UG/L (ref 30–400)

## 2019-02-25 ENCOUNTER — HOSPITAL ENCOUNTER (OUTPATIENT)
Age: 59
Discharge: HOME | End: 2019-02-25
Payer: COMMERCIAL

## 2019-02-25 DIAGNOSIS — K52.9 COLITIS: ICD-10-CM

## 2019-02-25 LAB
ABSOLUTE EOS #: 0.05 K/UL (ref 0–0.4)
ABSOLUTE IMMATURE GRANULOCYTE: ABNORMAL K/UL (ref 0–0.3)
ABSOLUTE LYMPH #: 1.84 K/UL (ref 1–4.8)
ABSOLUTE MONO #: 0.5 K/UL (ref 0.1–1.2)
BASOPHILS # BLD: 0 % (ref 0–2)
BASOPHILS ABSOLUTE: 0.03 K/UL (ref 0–0.2)
DIFFERENTIAL TYPE: ABNORMAL
EOSINOPHILS RELATIVE PERCENT: 1 % (ref 1–4)
HCT VFR BLD CALC: 40.4 % (ref 41–53)
HEMOGLOBIN: 13 G/DL (ref 13.5–17.5)
IMMATURE GRANULOCYTES: ABNORMAL %
LYMPHOCYTES # BLD: 25 % (ref 24–44)
MCH RBC QN AUTO: 28.9 PG (ref 26–34)
MCHC RBC AUTO-ENTMCNC: 32.2 G/DL (ref 31–37)
MCV RBC AUTO: 89.8 FL (ref 80–100)
MONOCYTES # BLD: 7 % (ref 2–11)
NRBC AUTOMATED: ABNORMAL PER 100 WBC
PDW BLD-RTO: 14.3 % (ref 12.5–15.4)
PLATELET # BLD: 282 K/UL (ref 140–450)
PLATELET ESTIMATE: ABNORMAL
PMV BLD AUTO: 10.3 FL (ref 8–14)
RBC # BLD: 4.5 M/UL (ref 4.5–5.9)
RBC # BLD: ABNORMAL 10*6/UL
SEG NEUTROPHILS: 67 % (ref 36–66)
SEGMENTED NEUTROPHILS ABSOLUTE COUNT: 5.08 K/UL (ref 1.8–7.7)
WBC # BLD: 7.5 K/UL (ref 3.5–11)
WBC # BLD: ABNORMAL 10*3/UL

## 2019-02-25 PROCEDURE — 36415 COLL VENOUS BLD VENIPUNCTURE: CPT

## 2019-02-25 PROCEDURE — 82728 ASSAY OF FERRITIN: CPT

## 2019-02-25 PROCEDURE — 85025 COMPLETE CBC W/AUTO DIFF WBC: CPT

## 2019-02-26 LAB — FERRITIN: 36 UG/L (ref 30–400)

## 2019-05-13 ENCOUNTER — HOSPITAL ENCOUNTER (OUTPATIENT)
Age: 59
Discharge: HOME | End: 2019-05-13
Payer: COMMERCIAL

## 2019-05-13 DIAGNOSIS — K52.9 COLITIS: ICD-10-CM

## 2019-05-13 LAB
ABSOLUTE EOS #: 0.1 K/UL (ref 0–0.4)
ABSOLUTE IMMATURE GRANULOCYTE: ABNORMAL K/UL (ref 0–0.3)
ABSOLUTE LYMPH #: 1.8 K/UL (ref 1–4.8)
ABSOLUTE MONO #: 0.5 K/UL (ref 0.1–1.2)
ALBUMIN SERPL-MCNC: 4.2 G/DL (ref 3.5–5.2)
ALBUMIN/GLOBULIN RATIO: 1.1 (ref 1–2.5)
ALP BLD-CCNC: 87 U/L (ref 40–129)
ALT SERPL-CCNC: 13 U/L (ref 5–41)
ANION GAP SERPL CALCULATED.3IONS-SCNC: 12 MMOL/L (ref 9–17)
AST SERPL-CCNC: 20 U/L
BASOPHILS # BLD: 0 % (ref 0–2)
BASOPHILS ABSOLUTE: 0 K/UL (ref 0–0.2)
BILIRUB SERPL-MCNC: 0.26 MG/DL (ref 0.3–1.2)
BUN BLDV-MCNC: 11 MG/DL (ref 6–20)
BUN/CREAT BLD: ABNORMAL (ref 9–20)
CALCIUM SERPL-MCNC: 9.6 MG/DL (ref 8.6–10.4)
CHLORIDE BLD-SCNC: 101 MMOL/L (ref 98–107)
CO2: 29 MMOL/L (ref 20–31)
CREAT SERPL-MCNC: 1.08 MG/DL (ref 0.7–1.2)
DIFFERENTIAL TYPE: ABNORMAL
EOSINOPHILS RELATIVE PERCENT: 1 % (ref 1–4)
FERRITIN: 43 UG/L (ref 30–400)
GFR AFRICAN AMERICAN: >60 ML/MIN
GFR NON-AFRICAN AMERICAN: >60 ML/MIN
GFR SERPL CREATININE-BSD FRML MDRD: ABNORMAL ML/MIN/{1.73_M2}
GFR SERPL CREATININE-BSD FRML MDRD: ABNORMAL ML/MIN/{1.73_M2}
GLUCOSE BLD-MCNC: 77 MG/DL (ref 70–99)
HCT VFR BLD CALC: 42.6 % (ref 41–53)
HEMOGLOBIN: 14.5 G/DL (ref 13.5–17.5)
IMMATURE GRANULOCYTES: ABNORMAL %
LYMPHOCYTES # BLD: 21 % (ref 24–44)
MCH RBC QN AUTO: 30.1 PG (ref 26–34)
MCHC RBC AUTO-ENTMCNC: 34.1 G/DL (ref 31–37)
MCV RBC AUTO: 88.2 FL (ref 80–100)
MONOCYTES # BLD: 5 % (ref 2–11)
NRBC AUTOMATED: ABNORMAL PER 100 WBC
PDW BLD-RTO: 14.2 % (ref 12.5–15.4)
PLATELET # BLD: 320 K/UL (ref 140–450)
PLATELET ESTIMATE: ABNORMAL
PMV BLD AUTO: 8.4 FL (ref 6–12)
POTASSIUM SERPL-SCNC: 4.4 MMOL/L (ref 3.7–5.3)
RBC # BLD: 4.83 M/UL (ref 4.5–5.9)
RBC # BLD: ABNORMAL 10*6/UL
SEG NEUTROPHILS: 73 % (ref 36–66)
SEGMENTED NEUTROPHILS ABSOLUTE COUNT: 6.5 K/UL (ref 1.8–7.7)
SODIUM BLD-SCNC: 142 MMOL/L (ref 135–144)
TOTAL PROTEIN: 7.9 G/DL (ref 6.4–8.3)
VITAMIN B-12: 915 PG/ML (ref 232–1245)
VITAMIN D 25-HYDROXY: 22 NG/ML (ref 30–100)
WBC # BLD: 8.9 K/UL (ref 3.5–11)
WBC # BLD: ABNORMAL 10*3/UL

## 2019-05-13 PROCEDURE — 36415 COLL VENOUS BLD VENIPUNCTURE: CPT

## 2019-05-13 PROCEDURE — 84630 ASSAY OF ZINC: CPT

## 2019-05-13 PROCEDURE — 82525 ASSAY OF COPPER: CPT

## 2019-05-13 PROCEDURE — 82728 ASSAY OF FERRITIN: CPT

## 2019-05-13 PROCEDURE — 82607 VITAMIN B-12: CPT

## 2019-05-13 PROCEDURE — 85025 COMPLETE CBC W/AUTO DIFF WBC: CPT

## 2019-05-13 PROCEDURE — 82306 VITAMIN D 25 HYDROXY: CPT

## 2019-05-13 PROCEDURE — 80053 COMPREHEN METABOLIC PANEL: CPT

## 2019-05-15 ENCOUNTER — OFFICE VISIT (OUTPATIENT)
Dept: ONCOLOGY | Age: 59
End: 2019-05-15
Payer: COMMERCIAL

## 2019-05-15 VITALS
TEMPERATURE: 97.4 F | DIASTOLIC BLOOD PRESSURE: 77 MMHG | HEART RATE: 65 BPM | RESPIRATION RATE: 16 BRPM | WEIGHT: 151.6 LBS | BODY MASS INDEX: 22.39 KG/M2 | SYSTOLIC BLOOD PRESSURE: 112 MMHG

## 2019-05-15 DIAGNOSIS — D50.0 IRON DEFICIENCY ANEMIA DUE TO CHRONIC BLOOD LOSS: Primary | ICD-10-CM

## 2019-05-15 LAB — COPPER: 139 UG/DL (ref 70–140)

## 2019-05-15 PROCEDURE — 99214 OFFICE O/P EST MOD 30 MIN: CPT | Performed by: INTERNAL MEDICINE

## 2019-05-15 RX ORDER — MELOXICAM 15 MG/1
TABLET ORAL
COMMUNITY
Start: 2019-02-13

## 2019-05-15 NOTE — PROGRESS NOTES
PROGRESS NOTE    PCP: Dale Ross MD  Referring Provider: No ref. provider found    VISIT DIAGNOSIS:  The encounter diagnosis was Iron deficiency anemia due to chronic blood loss. PAST MEDICAL HISTORY:      Diagnosis Date    Anemia     Head injury        PAST SURGICAL HISTORY:      Procedure Laterality Date    CYST REMOVAL      TONSILLECTOMY      VASECTOMY         CURRENT MEDICATIONS:   Current Outpatient Medications   Medication Sig Dispense Refill    meloxicam (MOBIC) 15 MG tablet       indomethacin (INDOCIN) 50 MG capsule Take 50 mg by mouth 2 times daily (with meals)       Multiple Vitamins-Minerals (THERAPEUTIC VITAMINS/MINERALS PO) Take by mouth 1 every other day      ASPIRIN PO Take by mouth       ferrous sulfate 325 (65 Fe) MG tablet Take 325 mg by mouth daily (with breakfast)       No current facility-administered medications for this visit. SUBJECTIVE:  Gaviota Walker is a very pleasant 60 y.o. male who is her for continued evaluation follow-up of his iron deficiency anemia as well as other abnormalities associated with his known colitis. Off all colitis medication per his description he's doing well on restricting his diet. He notes rare diarrhea he notes rare intermittent abdominal pain. He denies blood in stools black or tarry stools. He denies fevers chills night sweats  Bumps bone pain. There is some rare nausea associated with ferrous sulfate. He describes to me that he \"read something in the [place] about iron causing cancer. Velora Sis Velora Sis \"; He then went on to discuss how really \"too much I think. Velora Sis Velora Sis \"; Was likely causing the cancer. Cystic indication were increased cancers in the liver which I suspect is discussed with him was from iron overload related to hemochromatosis which he pretty much acknowledged. He said no fevers no chills no night sweats no infections. Swollen hot joints denies skin rash. Denies pica. Energy and sense of well-being are quite good.   He continues 144 mmol/L    Potassium 4.4 3.7 - 5.3 mmol/L    Chloride 101 98 - 107 mmol/L    CO2 29 20 - 31 mmol/L    Anion Gap 12 9 - 17 mmol/L    Alkaline Phosphatase 87 40 - 129 U/L    ALT 13 5 - 41 U/L    AST 20 <40 U/L    Total Bilirubin 0.26 (L) 0.3 - 1.2 mg/dL    Total Protein 7.9 6.4 - 8.3 g/dL    Alb 4.2 3.5 - 5.2 g/dL    Albumin/Globulin Ratio 1.1 1.0 - 2.5    GFR Non-African American >60 >60 mL/min    GFR African American >60 >60 mL/min    GFR Comment          GFR Staging NOT REPORTED    Vitamin B12   Result Value Ref Range    Vitamin B-12 915 232 - 1245 pg/mL   Vitamin D 25 Hydroxy   Result Value Ref Range    Vit D, 25-Hydroxy 22.0 (L) 30.0 - 100.0 ng/mL   Ferritin   Result Value Ref Range    Ferritin 43 30 - 400 ug/L   CBC With Auto Differential   Result Value Ref Range    WBC 8.9 3.5 - 11.0 k/uL    RBC 4.83 4.5 - 5.9 m/uL    Hemoglobin 14.5 13.5 - 17.5 g/dL    Hematocrit 42.6 41 - 53 %    MCV 88.2 80 - 100 fL    MCH 30.1 26 - 34 pg    MCHC 34.1 31 - 37 g/dL    RDW 14.2 12.5 - 15.4 %    Platelets 135 922 - 356 k/uL    MPV 8.4 6.0 - 12.0 fL    NRBC Automated NOT REPORTED per 100 WBC    Differential Type NOT REPORTED     Seg Neutrophils 73 (H) 36 - 66 %    Lymphocytes 21 (L) 24 - 44 %    Monocytes 5 2 - 11 %    Eosinophils % 1 1 - 4 %    Basophils 0 0 - 2 %    Immature Granulocytes NOT REPORTED 0 %    Segs Absolute 6.50 1.8 - 7.7 k/uL    Absolute Lymph # 1.80 1.0 - 4.8 k/uL    Absolute Mono # 0.50 0.1 - 1.2 k/uL    Absolute Eos # 0.10 0.0 - 0.4 k/uL    Basophils # 0.00 0.0 - 0.2 k/uL    Absolute Immature Granulocyte NOT REPORTED 0.00 - 0.30 k/uL    WBC Morphology NOT REPORTED     RBC Morphology NOT REPORTED     Platelet Estimate NOT REPORTED        IMPRESSION:     1. Iron deficiency anemia due to chronic blood loss        There is no problem list on file for this patient. PLAN:     1. Is a 40-year-old white male with known history of hemorrhoids as well as diagnosis of colitis.   Follow-up with GI and at present his colitis is managed with just diet. He was found to be anemic and iron deficient. He was also donating blood regularly at that time. I've had an abstain from blood donation and is continued on ferrous sulfate 325 mg 1 by mouth twice a day. Karuna Pearce is over 50 at this point likely reflective of normal iron stores and hemoglobin routine 0.5 with an MCV of 88.2 highly reflective of normal/adequate iron storage. He like to titrate down on the ferrous sulfate I've discussed that certainly he can attempt to go to one tablet daily are one alternating with 2 on a daily basis. I recommended checking CBC and ferritin every 2 month intervals when he prefer 3. I've cautioned that if he gets more tired out worn out he feels more short of breath with exertion, develops pica, fatigue from no other cause should then contact me we would check levels sooner or the time that he symptomatic. He is severely vitamin D deficient and I've once again pointed the fact that [place] are at a [place] where we avoid sunlight and its, and he is son screens as well absorption is likely an issue with his colitis. I recommended 4000 international units of vitamin D3 daily with recheck at 3 months. As well continue to monitor copper, zinc, as well as B12. We'll otherwise plan a 6 month follow-up. I've encouraged him to contact me in the interim new questions problems or ongoing issues. He's verbalizes understanding and agreement with the plan of care.       Electronically signed by Nedra Zamora DO on 5/15/2019 at 3:44 PM

## 2019-05-16 LAB — ZINC: 65 UG/DL (ref 60–120)

## 2019-11-26 ENCOUNTER — HOSPITAL ENCOUNTER (OUTPATIENT)
Age: 59
Discharge: HOME | End: 2019-11-26
Payer: COMMERCIAL

## 2019-11-26 DIAGNOSIS — D50.0 IRON DEFICIENCY ANEMIA DUE TO CHRONIC BLOOD LOSS: ICD-10-CM

## 2019-11-26 LAB
ABSOLUTE EOS #: 0.1 K/UL (ref 0–0.4)
ABSOLUTE IMMATURE GRANULOCYTE: NORMAL K/UL (ref 0–0.3)
ABSOLUTE LYMPH #: 1.5 K/UL (ref 1–4.8)
ABSOLUTE MONO #: 0.5 K/UL (ref 0.1–1.2)
BASOPHILS # BLD: 1 % (ref 0–2)
BASOPHILS ABSOLUTE: 0 K/UL (ref 0–0.2)
DIFFERENTIAL TYPE: NORMAL
EOSINOPHILS RELATIVE PERCENT: 1 % (ref 1–4)
FOLATE: 17.3 NG/ML
HCT VFR BLD CALC: 41.5 % (ref 41–53)
HEMOGLOBIN: 13.8 G/DL (ref 13.5–17.5)
IMMATURE GRANULOCYTES: NORMAL %
LYMPHOCYTES # BLD: 25 % (ref 24–44)
MCH RBC QN AUTO: 29.5 PG (ref 26–34)
MCHC RBC AUTO-ENTMCNC: 33.1 G/DL (ref 31–37)
MCV RBC AUTO: 89.2 FL (ref 80–100)
MONOCYTES # BLD: 9 % (ref 2–11)
NRBC AUTOMATED: NORMAL PER 100 WBC
PDW BLD-RTO: 14.4 % (ref 12.5–15.4)
PLATELET # BLD: 270 K/UL (ref 140–450)
PLATELET ESTIMATE: NORMAL
PMV BLD AUTO: 8.7 FL (ref 6–12)
RBC # BLD: 4.66 M/UL (ref 4.5–5.9)
RBC # BLD: NORMAL 10*6/UL
SEG NEUTROPHILS: 64 % (ref 36–66)
SEGMENTED NEUTROPHILS ABSOLUTE COUNT: 3.9 K/UL (ref 1.8–7.7)
VITAMIN B-12: 778 PG/ML (ref 232–1245)
VITAMIN D 25-HYDROXY: 26 NG/ML (ref 30–100)
WBC # BLD: 6 K/UL (ref 3.5–11)
WBC # BLD: NORMAL 10*3/UL

## 2019-11-26 PROCEDURE — 85025 COMPLETE CBC W/AUTO DIFF WBC: CPT

## 2019-11-26 PROCEDURE — 82746 ASSAY OF FOLIC ACID SERUM: CPT

## 2019-11-26 PROCEDURE — 82306 VITAMIN D 25 HYDROXY: CPT

## 2019-11-26 PROCEDURE — 36415 COLL VENOUS BLD VENIPUNCTURE: CPT

## 2019-11-26 PROCEDURE — 82607 VITAMIN B-12: CPT

## 2019-12-04 ENCOUNTER — OFFICE VISIT (OUTPATIENT)
Dept: ONCOLOGY | Age: 59
End: 2019-12-04
Payer: COMMERCIAL

## 2019-12-04 ENCOUNTER — TELEPHONE (OUTPATIENT)
Dept: ONCOLOGY | Age: 59
End: 2019-12-04

## 2019-12-04 VITALS
BODY MASS INDEX: 22.7 KG/M2 | DIASTOLIC BLOOD PRESSURE: 84 MMHG | TEMPERATURE: 97.9 F | RESPIRATION RATE: 16 BRPM | WEIGHT: 153.7 LBS | HEART RATE: 54 BPM | SYSTOLIC BLOOD PRESSURE: 132 MMHG

## 2019-12-04 DIAGNOSIS — D50.0 IRON DEFICIENCY ANEMIA DUE TO CHRONIC BLOOD LOSS: Primary | ICD-10-CM

## 2019-12-04 PROCEDURE — 99211 OFF/OP EST MAY X REQ PHY/QHP: CPT | Performed by: INTERNAL MEDICINE

## 2019-12-04 PROCEDURE — 99213 OFFICE O/P EST LOW 20 MIN: CPT | Performed by: INTERNAL MEDICINE

## 2021-11-01 ENCOUNTER — OFFICE VISIT (OUTPATIENT)
Dept: GASTROENTEROLOGY | Age: 61
End: 2021-11-01
Payer: COMMERCIAL

## 2021-11-01 ENCOUNTER — TELEPHONE (OUTPATIENT)
Dept: GASTROENTEROLOGY | Age: 61
End: 2021-11-01

## 2021-11-01 VITALS
HEART RATE: 60 BPM | OXYGEN SATURATION: 98 % | WEIGHT: 153 LBS | DIASTOLIC BLOOD PRESSURE: 83 MMHG | HEIGHT: 69 IN | SYSTOLIC BLOOD PRESSURE: 134 MMHG | BODY MASS INDEX: 22.66 KG/M2 | TEMPERATURE: 97.7 F

## 2021-11-01 DIAGNOSIS — Z87.19 HX OF CROHN'S DISEASE: Primary | ICD-10-CM

## 2021-11-01 PROCEDURE — 99204 OFFICE O/P NEW MOD 45 MIN: CPT | Performed by: INTERNAL MEDICINE

## 2021-11-01 ASSESSMENT — ENCOUNTER SYMPTOMS
COUGH: 0
ANAL BLEEDING: 0
ABDOMINAL DISTENTION: 0
COLOR CHANGE: 0
ABDOMINAL PAIN: 0
BLOOD IN STOOL: 0
VOICE CHANGE: 0
VOMITING: 0
BACK PAIN: 0
DIARRHEA: 1
NAUSEA: 0
WHEEZING: 0
CONSTIPATION: 0
SHORTNESS OF BREATH: 0
RECTAL PAIN: 0
SORE THROAT: 0

## 2021-11-01 NOTE — TELEPHONE ENCOUNTER
Pt to check with his wife and will call the office to schedule colon proc ordered at [address] on 11/1/21. Pt is not on any blood thinners. He is vaccinated for Covid-19 virus. He has a kidney cyst (michael w/ Ray[facility] for prep).   Pharmacy is [place], [place]

## 2021-11-01 NOTE — PROGRESS NOTES
Reason for Referral:   No referring provider defined for this encounter. Chief Complaint   Patient presents with    New Patient    Crohn's Disease     was seeing [facility]            HISTORY OF PRESENT ILLNESS: Shari Marti is a 57 y.o. male , referred for evaluation of CD      here for the first time   Is to follow with a different gastroenterology group but has not been seen for long time, he was diagnosed with Crohn's disease   ? Hx CD . years ago  Dr Danielle Cancino   ? [facility] large and small bowel he was told    no medication was started on Mesalamine could not tolerate it and also has kid issues   No flare up symptoms   may be some diarrhea mildly       Labs up to 2019 were all normal    bx from a colonoscopy in 2017 : colitis     Past Medical,Family, and Social History reviewed and does contribute to the patient presentingcondition. Patient's PMH/PSH,SH,PSYCH Hx, MEDs, ALLERGIES, and ROS were all reviewed and updated in the appropriate sections.     PAST MEDICAL HISTORY:  Past Medical History:   Diagnosis Date    Anemia     Head injury        Past Surgical History:   Procedure Laterality Date    CYST REMOVAL      TONSILLECTOMY      VASECTOMY         CURRENT MEDICATIONS:    Current Outpatient Medications:     meloxicam (MOBIC) 15 MG tablet, , Disp: , Rfl:     ferrous sulfate 325 (65 Fe) MG tablet, Take 325 mg by mouth daily (with breakfast), Disp: , Rfl:     indomethacin (INDOCIN) 50 MG capsule, Take 50 mg by mouth 2 times daily (with meals) , Disp: , Rfl:     Multiple Vitamins-Minerals (THERAPEUTIC VITAMINS/MINERALS PO), Take by mouth 1 every other day, Disp: , Rfl:     ASPIRIN PO, Take by mouth , Disp: , Rfl:     ALLERGIES:   No Known Allergies    FAMILY HISTORY:       Problem Relation Age of Onset    Arthritis Mother     Stroke Mother     Arthritis Father     Heart Disease Father     Other Sister     Cancer Brother     Breast Cancer Paternal Grandmother     Diabetes Paternal Grandfather     Kidney Disease Maternal Uncle     Other Maternal Uncle     Other Maternal Grandfather          SOCIAL HISTORY:   Social History     Socioeconomic History    Marital status:      Spouse name: Not on file    Number of children: Not on file    Years of education: Not on file    Highest education level: Not on file   Occupational History    Not on file   Tobacco Use    Smoking status: Never Smoker    Smokeless tobacco: Never Used   Substance and Sexual Activity    Alcohol use: Yes     Comment: Occ    Drug use: No    Sexual activity: Not on file   Other Topics Concern    Not on file   Social History Narrative    Not on file     Social Determinants of Health     Financial Resource Strain:     Difficulty of Paying Living Expenses:    Food Insecurity:     Worried About Running Out of Food in the Last Year:     [address] in the Last Year:    Transportation Needs:     Lack of Transportation (Medical):  Lack of Transportation (Non-Medical):    Physical Activity:     Days of Exercise per Week:     Minutes of Exercise per Session:    Stress:     Feeling of Stress :    Social Connections:     Frequency of Communication with Friends and Family:     Frequency of Social Gatherings with Friends and Family:     Attends Taoist Services:     Active Member of Clubs or Organizations:     Attends Club or Organization Meetings:     Marital Status:    Intimate Partner Violence:     Fear of Current or Ex-Partner:     Emotionally Abused:     Physically Abused:     Sexually Abused:        REVIEW OF SYSTEMS: A 12-point review of systemswas obtained and pertinent positives and negatives were enumerated above in the history of present illness. All other reviewed systems / symptoms were negative. Review of Systems   Constitutional: Negative for unexpected weight change. HENT: Negative for sore throat and voice change. Eyes: Negative for visual disturbance.    Respiratory: Negative for cough, shortness of breath and wheezing. Gastrointestinal: Positive for diarrhea. Negative for abdominal distention, abdominal pain, anal bleeding, blood in stool, constipation, nausea, rectal pain and vomiting. Genitourinary: Negative for difficulty urinating. Musculoskeletal: Negative for back pain, joint swelling and neck stiffness. Skin: Negative for color change, rash and wound. Neurological: Negative for dizziness, weakness, light-headedness and headaches. Hematological: Does not bruise/bleed easily. Psychiatric/Behavioral: Negative for confusion, hallucinations and sleep disturbance. LABORATORY DATA: Reviewed  Lab Results   Component Value Date    WBC 6.0 11/26/2019    HGB 13.8 11/26/2019    HCT 41.5 11/26/2019    MCV 89.2 11/26/2019     11/26/2019     05/13/2019    K 4.4 05/13/2019     05/13/2019    CO2 29 05/13/2019    BUN 11 05/13/2019    CREATININE 1.08 05/13/2019    LABALBU 4.2 05/13/2019    BILITOT 0.26 (L) 05/13/2019    ALKPHOS 87 05/13/2019    AST 20 05/13/2019    ALT 13 05/13/2019         Lab Results   Component Value Date    RBC 4.66 11/26/2019    HGB 13.8 11/26/2019    MCV 89.2 11/26/2019    MCH 29.5 11/26/2019    MCHC 33.1 11/26/2019    RDW 14.4 11/26/2019    MPV 8.7 11/26/2019    BASOPCT 1 11/26/2019    LYMPHSABS 1.50 11/26/2019    MONOSABS 0.50 11/26/2019    NEUTROABS 3.90 11/26/2019    EOSABS 0.10 11/26/2019    BASOSABS 0.00 11/26/2019         DIAGNOSTIC TESTING:     No results found. There were no vitals taken for this visit. PHYSICAL EXAMINATION: Vital signs reviewed per the nursing documentation. There is no height or weight on file to calculate BMI. Physical Exam  Vitals and nursing note reviewed. Constitutional:       General: He is not in acute distress. Appearance: He is well-developed. He is not diaphoretic. HENT:      Head: Normocephalic and atraumatic. Eyes:      General: No scleral icterus.      Pupils: Pupils are equal, round, and reactive to light. Neck:      Thyroid: No thyromegaly. Vascular: No JVD. Trachea: No tracheal deviation. Cardiovascular:      Rate and Rhythm: Normal rate and regular rhythm. Heart sounds: Normal heart sounds. No murmur heard. Pulmonary:      Effort: Pulmonary effort is normal. No respiratory distress. Breath sounds: Normal breath sounds. No wheezing. Abdominal:      General: Bowel sounds are normal. There is no distension. Palpations: Abdomen is soft. There is no mass. Tenderness: There is no abdominal tenderness. There is no guarding or rebound. Musculoskeletal:         General: No tenderness. Normal range of motion. Cervical back: Normal range of motion and neck supple. Skin:     General: Skin is warm. Coloration: Skin is not pale. Findings: No erythema or rash. Comments: He is not diaphoretic   Neurological:      Mental Status: He is alert and oriented to person, place, and time. Deep Tendon Reflexes: Reflexes are normal and symmetric. Psychiatric:         Behavior: Behavior normal.         Thought Content: Thought content normal.         Judgment: Judgment normal.         IMPRESSION: Mr. Nelly Ontiveros is a 57 y.o. male with      Diagnosis Orders   1. Hx of Crohn's disease  COLONOSCOPY W/ OR W/O BIOPSY       We will proceed with colonoscopy as a first step  Patient does not want to take medication he said I told him based on the result of the colonoscopy we will talk about it and see    Diet/life style/natural hx /complication of the dx were all explained in details   Past medical, past surgical, social history, psychiatric history, medications or allergies, all reviewed and  updated    . Thank you for allowing me to participate in the care of Mr. Nelly Ontiveros. For any further questions please do not hesitate to contact me. I have reviewed and agree with the MA/RN ROS.    Note is dictated utilizing voice recognition software. Unfortunately this leads to occasional typographical errors. Please contact our office if you have any questions.     Army Davion MD  [facility]  O: #[phone]

## 2021-11-15 NOTE — TELEPHONE ENCOUNTER
Pt stopped into [facility] wanting to get some dates for proc and to see which facilities dr does procedures at. Writer gave pt information and he will check w/ his wife and call us back to schedule.

## 2022-08-05 NOTE — TELEPHONE ENCOUNTER
Pt lvm stating he would like to schedule colonoscopy, pt also states he is having symptoms of chronic diarrhea would like to know if anything can be prescribed, routing call to [facility] nurse to f/u.

## 2022-08-05 NOTE — TELEPHONE ENCOUNTER
Writer returned pt call, writer notes pt is est with [facility], pt last OV was 11/21/21 and [facility] ordered colon, pt states he has not had it yet but pt is having issues with diarrhea he feels it is time to get colon done. Per colon screen questionnaire pt is able to schedule colon. YING Ely Monday [email or web address] colon golytely. Reviewed bowel prep instructions with patient over phone and mailed to home address, copy also sent to pt jos.

## 2022-08-08 RX ORDER — POLYETHYLENE GLYCOL 3350, SODIUM SULFATE ANHYDROUS, SODIUM BICARBONATE, SODIUM CHLORIDE, POTASSIUM CHLORIDE 236; 22.74; 6.74; 5.86; 2.97 G/4L; G/4L; G/4L; G/4L; G/4L
POWDER, FOR SOLUTION ORAL
Qty: 4000 ML | Refills: 0 | Status: SHIPPED | OUTPATIENT
Start: 2022-08-08 | End: 2022-10-13 | Stop reason: ALTCHOICE

## 2022-09-09 ENCOUNTER — TELEPHONE (OUTPATIENT)
Dept: GASTROENTEROLOGY | Age: 62
End: 2022-09-09

## 2022-09-09 NOTE — TELEPHONE ENCOUNTER
Patient called as he was told by Pre testing to immediatly stop his Multivitamin. Writer let him know he does not have to stop that just his Mobic and ASA. And yes her could have a drink or two on Saturday just not on Sunday when he is doing his prep.

## 2022-09-12 ENCOUNTER — HOSPITAL ENCOUNTER (OUTPATIENT)
Age: 62
Setting detail: OUTPATIENT SURGERY
Discharge: HOME | End: 2022-09-12
Attending: INTERNAL MEDICINE | Admitting: INTERNAL MEDICINE
Payer: COMMERCIAL

## 2022-09-12 ENCOUNTER — ANESTHESIA (OUTPATIENT)
Dept: OPERATING ROOM | Age: 62
End: 2022-09-12
Payer: COMMERCIAL

## 2022-09-12 ENCOUNTER — ANESTHESIA EVENT (OUTPATIENT)
Dept: OPERATING ROOM | Age: 62
End: 2022-09-12
Payer: COMMERCIAL

## 2022-09-12 VITALS
WEIGHT: 145 LBS | OXYGEN SATURATION: 100 % | DIASTOLIC BLOOD PRESSURE: 81 MMHG | TEMPERATURE: 96.8 F | HEIGHT: 69 IN | SYSTOLIC BLOOD PRESSURE: 142 MMHG | RESPIRATION RATE: 16 BRPM | BODY MASS INDEX: 21.48 KG/M2 | HEART RATE: 50 BPM

## 2022-09-12 DIAGNOSIS — Z87.19 HISTORY OF CROHN'S DISEASE: ICD-10-CM

## 2022-09-12 PROCEDURE — 3700000001 HC ADD 15 MINUTES (ANESTHESIA): Performed by: INTERNAL MEDICINE

## 2022-09-12 PROCEDURE — 3609010300 HC COLONOSCOPY W/BIOPSY SINGLE/MULTIPLE: Performed by: INTERNAL MEDICINE

## 2022-09-12 PROCEDURE — 2709999900 HC NON-CHARGEABLE SUPPLY: Performed by: INTERNAL MEDICINE

## 2022-09-12 PROCEDURE — 45380 COLONOSCOPY AND BIOPSY: CPT | Performed by: INTERNAL MEDICINE

## 2022-09-12 PROCEDURE — 7100000010 HC PHASE II RECOVERY - FIRST 15 MIN: Performed by: INTERNAL MEDICINE

## 2022-09-12 PROCEDURE — 88305 TISSUE EXAM BY PATHOLOGIST: CPT

## 2022-09-12 PROCEDURE — 6360000002 HC RX W HCPCS: Performed by: NURSE ANESTHETIST, CERTIFIED REGISTERED

## 2022-09-12 PROCEDURE — 7100000011 HC PHASE II RECOVERY - ADDTL 15 MIN: Performed by: INTERNAL MEDICINE

## 2022-09-12 PROCEDURE — 2580000003 HC RX 258: Performed by: NURSE ANESTHETIST, CERTIFIED REGISTERED

## 2022-09-12 PROCEDURE — 3700000000 HC ANESTHESIA ATTENDED CARE: Performed by: INTERNAL MEDICINE

## 2022-09-12 PROCEDURE — 2500000003 HC RX 250 WO HCPCS: Performed by: NURSE ANESTHETIST, CERTIFIED REGISTERED

## 2022-09-12 RX ORDER — SODIUM CHLORIDE 9 MG/ML
25 INJECTION, SOLUTION INTRAVENOUS PRN
Status: DISCONTINUED | OUTPATIENT
Start: 2022-09-12 | End: 2022-09-12 | Stop reason: HOSPADM

## 2022-09-12 RX ORDER — SODIUM CHLORIDE, SODIUM LACTATE, POTASSIUM CHLORIDE, CALCIUM CHLORIDE 600; 310; 30; 20 MG/100ML; MG/100ML; MG/100ML; MG/100ML
INJECTION, SOLUTION INTRAVENOUS CONTINUOUS
Status: DISCONTINUED | OUTPATIENT
Start: 2022-09-12 | End: 2022-09-12 | Stop reason: HOSPADM

## 2022-09-12 RX ORDER — ONDANSETRON 2 MG/ML
4 INJECTION INTRAMUSCULAR; INTRAVENOUS
Status: DISCONTINUED | OUTPATIENT
Start: 2022-09-12 | End: 2022-09-12 | Stop reason: HOSPADM

## 2022-09-12 RX ORDER — MEPERIDINE HYDROCHLORIDE 50 MG/ML
12.5 INJECTION INTRAMUSCULAR; INTRAVENOUS; SUBCUTANEOUS EVERY 5 MIN PRN
Status: DISCONTINUED | OUTPATIENT
Start: 2022-09-12 | End: 2022-09-12 | Stop reason: HOSPADM

## 2022-09-12 RX ORDER — SODIUM CHLORIDE 0.9 % (FLUSH) 0.9 %
5-40 SYRINGE (ML) INJECTION PRN
Status: DISCONTINUED | OUTPATIENT
Start: 2022-09-12 | End: 2022-09-12 | Stop reason: HOSPADM

## 2022-09-12 RX ORDER — DIPHENHYDRAMINE HYDROCHLORIDE 50 MG/ML
12.5 INJECTION INTRAMUSCULAR; INTRAVENOUS
Status: DISCONTINUED | OUTPATIENT
Start: 2022-09-12 | End: 2022-09-12 | Stop reason: HOSPADM

## 2022-09-12 RX ORDER — LABETALOL HYDROCHLORIDE 5 MG/ML
10 INJECTION, SOLUTION INTRAVENOUS
Status: DISCONTINUED | OUTPATIENT
Start: 2022-09-12 | End: 2022-09-12 | Stop reason: HOSPADM

## 2022-09-12 RX ORDER — PROMETHAZINE HYDROCHLORIDE 25 MG/ML
6.25 INJECTION, SOLUTION INTRAMUSCULAR; INTRAVENOUS EVERY 5 MIN PRN
Status: DISCONTINUED | OUTPATIENT
Start: 2022-09-12 | End: 2022-09-12 | Stop reason: HOSPADM

## 2022-09-12 RX ORDER — MORPHINE SULFATE 2 MG/ML
2 INJECTION, SOLUTION INTRAMUSCULAR; INTRAVENOUS EVERY 5 MIN PRN
Status: DISCONTINUED | OUTPATIENT
Start: 2022-09-12 | End: 2022-09-12 | Stop reason: HOSPADM

## 2022-09-12 RX ORDER — MIDAZOLAM HYDROCHLORIDE 1 MG/ML
INJECTION INTRAMUSCULAR; INTRAVENOUS PRN
Status: DISCONTINUED | OUTPATIENT
Start: 2022-09-12 | End: 2022-09-12 | Stop reason: SDUPTHER

## 2022-09-12 RX ORDER — SODIUM CHLORIDE 0.9 % (FLUSH) 0.9 %
5-40 SYRINGE (ML) INJECTION EVERY 12 HOURS SCHEDULED
Status: DISCONTINUED | OUTPATIENT
Start: 2022-09-12 | End: 2022-09-12 | Stop reason: HOSPADM

## 2022-09-12 RX ORDER — PROPOFOL 10 MG/ML
INJECTION, EMULSION INTRAVENOUS PRN
Status: DISCONTINUED | OUTPATIENT
Start: 2022-09-12 | End: 2022-09-12 | Stop reason: SDUPTHER

## 2022-09-12 RX ORDER — LIDOCAINE HYDROCHLORIDE 10 MG/ML
1 INJECTION, SOLUTION EPIDURAL; INFILTRATION; INTRACAUDAL; PERINEURAL
Status: DISCONTINUED | OUTPATIENT
Start: 2022-09-12 | End: 2022-09-12 | Stop reason: HOSPADM

## 2022-09-12 RX ORDER — LIDOCAINE HYDROCHLORIDE 10 MG/ML
INJECTION, SOLUTION INFILTRATION; PERINEURAL PRN
Status: DISCONTINUED | OUTPATIENT
Start: 2022-09-12 | End: 2022-09-12 | Stop reason: SDUPTHER

## 2022-09-12 RX ORDER — SODIUM CHLORIDE 9 MG/ML
INJECTION, SOLUTION INTRAVENOUS PRN
Status: DISCONTINUED | OUTPATIENT
Start: 2022-09-12 | End: 2022-09-12 | Stop reason: HOSPADM

## 2022-09-12 RX ORDER — OXYCODONE HYDROCHLORIDE AND ACETAMINOPHEN 5; 325 MG/1; MG/1
1 TABLET ORAL
Status: DISCONTINUED | OUTPATIENT
Start: 2022-09-12 | End: 2022-09-12 | Stop reason: HOSPADM

## 2022-09-12 RX ORDER — IPRATROPIUM BROMIDE AND ALBUTEROL SULFATE 2.5; .5 MG/3ML; MG/3ML
1 SOLUTION RESPIRATORY (INHALATION)
Status: DISCONTINUED | OUTPATIENT
Start: 2022-09-12 | End: 2022-09-12 | Stop reason: HOSPADM

## 2022-09-12 RX ORDER — GLYCOPYRROLATE 0.2 MG/ML
0.4 INJECTION INTRAMUSCULAR; INTRAVENOUS ONCE
Status: DISCONTINUED | OUTPATIENT
Start: 2022-09-12 | End: 2022-09-12 | Stop reason: HOSPADM

## 2022-09-12 RX ORDER — SODIUM CHLORIDE, SODIUM LACTATE, POTASSIUM CHLORIDE, CALCIUM CHLORIDE 600; 310; 30; 20 MG/100ML; MG/100ML; MG/100ML; MG/100ML
INJECTION, SOLUTION INTRAVENOUS CONTINUOUS PRN
Status: DISCONTINUED | OUTPATIENT
Start: 2022-09-12 | End: 2022-09-12 | Stop reason: SDUPTHER

## 2022-09-12 RX ORDER — MIDAZOLAM HYDROCHLORIDE 2 MG/2ML
2 INJECTION, SOLUTION INTRAMUSCULAR; INTRAVENOUS
Status: DISCONTINUED | OUTPATIENT
Start: 2022-09-12 | End: 2022-09-12 | Stop reason: HOSPADM

## 2022-09-12 RX ORDER — OXYCODONE HYDROCHLORIDE AND ACETAMINOPHEN 5; 325 MG/1; MG/1
2 TABLET ORAL
Status: DISCONTINUED | OUTPATIENT
Start: 2022-09-12 | End: 2022-09-12 | Stop reason: HOSPADM

## 2022-09-12 RX ADMIN — LIDOCAINE HYDROCHLORIDE 50 MG: 10 INJECTION, SOLUTION INFILTRATION; PERINEURAL at 12:18

## 2022-09-12 RX ADMIN — PROPOFOL 20 MG: 10 INJECTION, EMULSION INTRAVENOUS at 12:29

## 2022-09-12 RX ADMIN — PROPOFOL 20 MG: 10 INJECTION, EMULSION INTRAVENOUS at 12:23

## 2022-09-12 RX ADMIN — PROPOFOL 20 MG: 10 INJECTION, EMULSION INTRAVENOUS at 11:28

## 2022-09-12 RX ADMIN — PROPOFOL 100 MG: 10 INJECTION, EMULSION INTRAVENOUS at 12:19

## 2022-09-12 RX ADMIN — PROPOFOL 20 MG: 10 INJECTION, EMULSION INTRAVENOUS at 12:26

## 2022-09-12 RX ADMIN — SODIUM CHLORIDE, POTASSIUM CHLORIDE, SODIUM LACTATE AND CALCIUM CHLORIDE: 600; 310; 30; 20 INJECTION, SOLUTION INTRAVENOUS at 12:16

## 2022-09-12 RX ADMIN — PROPOFOL 20 MG: 10 INJECTION, EMULSION INTRAVENOUS at 12:33

## 2022-09-12 RX ADMIN — PROPOFOL 30 MG: 10 INJECTION, EMULSION INTRAVENOUS at 12:21

## 2022-09-12 RX ADMIN — PROPOFOL 20 MG: 10 INJECTION, EMULSION INTRAVENOUS at 12:31

## 2022-09-12 RX ADMIN — MIDAZOLAM 1 MG: 1 INJECTION INTRAMUSCULAR; INTRAVENOUS at 12:16

## 2022-09-12 RX ADMIN — PROPOFOL 20 MG: 10 INJECTION, EMULSION INTRAVENOUS at 12:37

## 2022-09-12 ASSESSMENT — PAIN - FUNCTIONAL ASSESSMENT: PAIN_FUNCTIONAL_ASSESSMENT: NONE - DENIES PAIN

## 2022-09-12 NOTE — H&P
Procedure History and Physical    Pre-Procedural Diagnosis:    CD     Indications:  same    Procedure Planned: colonoscopy     History Obtained From:  patient    HISTORY OF PRESENT ILLNESS:       The patient is a 60 y.o. male who presents for the above procedure.         Past Medical History:    Past Medical History:   Diagnosis Date    Anemia     iron deficiency    BPH (benign prostatic hyperplasia)     Crohn's disease ([facility] [address]     Head injury 1982    Kidney cysts     Murmur        Past Surgical History:    Past Surgical History:   Procedure Laterality Date    COLONOSCOPY      CYST REMOVAL      chest    TONSILLECTOMY      VASECTOMY         Medications:  Current Facility-Administered Medications   Medication Dose Route Frequency Provider Last Rate Last Admin    lidocaine PF 1 % injection 1 mL  1 mL IntraDERmal Once PRN Leny Pritchard MD        lactated ringers infusion   IntraVENous Continuous Leny Pritchard MD        sodium chloride flush 0.9 % injection 5-40 mL  5-40 mL IntraVENous 2 times per day Leny Pritchard MD        sodium chloride flush 0.9 % injection 5-40 mL  5-40 mL IntraVENous PRN Leny Pritchard MD        0.9 % sodium chloride infusion   IntraVENous PRN Leny Pritchard MD           Allergies:   No Known Allergies              Social   Social History     Tobacco Use    Smoking status: Never    Smokeless tobacco: Never   Substance Use Topics    Alcohol use: Yes     Comment: Occ        PSYCH HISTORY:  Depression No  Anxiety No  Suicide No       Family History   Problem Relation Age of Onset    Arthritis Mother     Stroke Mother     Arthritis Father     Heart Disease Father     Other Sister     Cancer Brother     Breast Cancer Paternal Grandmother     Diabetes Paternal Grandfather     Kidney Disease Maternal Uncle     Other Maternal Uncle     Other Maternal Grandfather       No family history of colon cancer, Crohn's disease, or ulcerative colitis    Problems with Sedation/Anesthesia in the past? no    REVIEW OF SYSTEMS:  12 point review of systems negative other than mentioned above. PHYSICAL EXAM:    Vitals:  BP (!) 140/78   Pulse 59   Temp 97.6 °F (36.4 °C)   Resp (!) 9   Ht 5' 9\" (1.753 m)   Wt 145 lb (65.8 kg)   SpO2 99%   BMI 21.41 kg/m²     Focused Exam related to procedure:    General appearance: NAD, conversant   Eyes: anicteric sclerae, moist conjunctivae; no lid-lag; PERRLA   Lungs: CTA, with normal respiratory effort and no intercostal retractions   CV: RRR, no MRGs   Abdomen: Soft, non-tender; no masses or HSM   Skin: Normal temperature, turgor and texture; no rash, ulcers or subcutaneous nodules     DATA:  CBC:   Lab Results   Component Value Date    WBC 6.0 11/26/2019    HGB 13.8 11/26/2019    HCT 41.5 11/26/2019    MCV 89.2 11/26/2019     11/26/2019     BUN/Cr:   Lab Results   Component Value Date    BUN 11 05/13/2019   ,   Lab Results   Component Value Date    CREATININE 1.08 05/13/2019     Potassium:   Lab Results   Component Value Date    K 4.4 05/13/2019     PT/INR: No results found for: INR, PROTIME    ASSESSMENT AND PLAN:       1. Patient is a 60 y.o. male with above specified procedure planned. Expected Sedation/Anesthesia Type: MAC    2. ASA ([address] Anesthesiology) Anesthesia Status: Class 1 - A normal healthy patient    3. Mallampati: I (soft palate, uvula, fauces, tonsillar pillars visible)  4. Procedure options, risks and benefits reviewed with Patient. Patient expresses understanding.     5.  Consent has been signed:  Yes    Jeanetta Pallas, MD

## 2022-09-12 NOTE — OP NOTE
PROCEDURE NOTE    DATE OF PROCEDURE: 9/12/2022    SURGEON: Danielle Felder MD  Facility : [facility]   ASSISTANT: None  Anesthesia: MAC  PREOPERATIVE DIAGNOSIS:   Crohn's disease    POSTOPERATIVE DIAGNOSIS: as described below    OPERATION: Total colonoscopy     ANESTHESIA: Moderate Sedation    ESTIMATED BLOOD LOSS: less than 50     COMPLICATIONS: None. SPECIMENS:  Was Obtained:     Random biopsies were taken throughout the colon right colon jar #1  Transverse colon jar #2  Left colon jar #3  Rectosigmoid and jar #4        HISTORY: The patient is a 60y.o. year old male with history of above preop diagnosis. I recommended colonoscopy with possible biopsy or polypectomy and I explained the risk, benefits, expected outcome, and alternatives to the procedure. Risks included but are not limited to bleeding, infection, respiratory distress, hypotension, and perforation of the colon and possibility of missing a lesion. The patient understands and is in agreement. The patient was counseled at length about the risks of sigrid Covid-19 during their perioperative period and any recovery window from their procedure. The patient was made aware that sigrid Covid-19  may worsen their prognosis for recovering from their procedure  and lend to a higher morbidity and/or mortality risk. All material risks, benefits, and reasonable alternatives including postponing the procedure were discussed. The patient does wish to proceed with the procedure at this time. PROCEDURE: The patient was given IV conscious sedation. The patient's SPO2 remained above 90% throughout the procedure. The colonoscope was inserted per rectum and advanced under direct vision to the cecum without difficulty. Post sedation note : The patient's SPO2 remained above 90% throughout the procedure. the vital signs remained stable , and no immediate complication form the procedure noted, patient will be ready for d/c when criteria is met . The prep was good. Findings:  Terminal ileum: normal    Cecum/Ascending colon: normal    Transverse colon: normal    Descending/Sigmoid colon: normal    Rectum/Anus: examined in normal and retroflexed positions and was normal  Random biopsies were taken throughout the colon right colon jar #1  Transverse colon jar #2  Left colon jar #3  Rectosigmoid and jar #4        Withdrawal Time was (minutes): 10    The colon was decompressed and the scope was removed. The patient tolerated the procedure well.      Recommendations/Plan:   Lifestyle and dietary modifications as discussed  F/U Biopsies  F/U In OfficeYes  Discussed with the family  Repeat colonoscopy [id number]    Electronically signed by Korin Mas MD  on 9/12/2022 at 12:51 PM

## 2022-09-12 NOTE — ANESTHESIA PRE PROCEDURE
[facility]     Head injury 1982    Kidney cysts     Murmur        Past Surgical History:        Procedure Laterality Date    COLONOSCOPY      CYST REMOVAL      chest    TONSILLECTOMY      VASECTOMY         Social History:    Social History     Tobacco Use    Smoking status: Never    Smokeless tobacco: Never   Substance Use Topics    Alcohol use: Yes     Comment: Occ                                Counseling given: Not Answered      Vital Signs (Current):   Vitals:    09/12/22 1117   BP: (!) 140/78   Pulse: 59   Resp: (!) 9   Temp: 97.6 °F (36.4 °C)   SpO2: 99%   Weight: 145 lb (65.8 kg)   Height: 5' 9\" (1.753 m)                                              BP Readings from Last 3 Encounters:   09/12/22 (!) 140/78   11/01/21 134/83   12/04/19 132/84       NPO Status: Time of last liquid consumption: 0700                        Time of last solid consumption: 2000                        Date of last liquid consumption: 09/12/22                        Date of last solid food consumption: 09/10/22    BMI:   Wt Readings from Last 3 Encounters:   09/12/22 145 lb (65.8 kg)   11/01/21 153 lb (69.4 kg)   12/04/19 153 lb 11.2 oz (69.7 kg)     Body mass index is 21.41 kg/m².     CBC:   Lab Results   Component Value Date/Time    WBC 6.0 11/26/2019 10:02 AM    RBC 4.66 11/26/2019 10:02 AM    HGB 13.8 11/26/2019 10:02 AM    HCT 41.5 11/26/2019 10:02 AM    MCV 89.2 11/26/2019 10:02 AM    RDW 14.4 11/26/2019 10:02 AM     11/26/2019 10:02 AM       CMP:   Lab Results   Component Value Date/Time     05/13/2019 01:33 PM    K 4.4 05/13/2019 01:33 PM     05/13/2019 01:33 PM    CO2 29 05/13/2019 01:33 PM    BUN 11 05/13/2019 01:33 PM    CREATININE 1.08 05/13/2019 01:33 PM    GFRAA >60 05/13/2019 01:33 PM    LABGLOM >60 05/13/2019 01:33 PM    GLUCOSE 77 05/13/2019 01:33 PM    PROT 7.9 05/13/2019 01:33 PM    CALCIUM 9.6 05/13/2019 01:33 PM    BILITOT 0.26 05/13/2019 01:33 PM    ALKPHOS 87 05/13/2019 01:33 PM    AST 20

## 2022-09-12 NOTE — DISCHARGE INSTRUCTIONS

## 2022-09-12 NOTE — ANESTHESIA POSTPROCEDURE EVALUATION
Department of Anesthesiology  Postprocedure Note    Patient: Arian Olsen  MRN: [id number]  YOB: 1960  Date of evaluation: 9/12/2022      Procedure Summary     Date: 09/12/22 Room / Location: [place] [address] / [facility]    Anesthesia Start: 1216 Anesthesia Stop: 1257    Procedure: COLONOSCOPY WITH RANDOM RIGHT COLON BIOPSIES, TRANSVERSE COLON BIOPSIES, RANDOM LEFT COLON BIOPSIES AND SIGMOID COLON BIOPSIES Diagnosis:       History of Crohn's disease      (History of Crohn's disease [Z87.19])    Surgeons: Meagan Storey MD Responsible Provider: Daisy Vizcaino MD    Anesthesia Type: MAC ASA Status: 3          Anesthesia Type: No value filed.     Brenna Phase I:      Brenna Phase II: Brenna Score: 9      Anesthesia Post Evaluation    Patient location during evaluation: PACU  Patient participation: complete - patient participated  Level of consciousness: awake and alert  Airway patency: patent  Nausea & Vomiting: no nausea and no vomiting  Complications: no  Cardiovascular status: hemodynamically stable  Respiratory status: room air and spontaneous ventilation  Hydration status: euvolemic  Multimodal analgesia pain management approach

## 2022-09-13 LAB — SURGICAL PATHOLOGY REPORT: NORMAL

## 2022-09-27 ENCOUNTER — TELEPHONE (OUTPATIENT)
Dept: GASTROENTEROLOGY | Age: 62
End: 2022-09-27

## 2022-09-27 DIAGNOSIS — Z87.19 HX OF CROHN'S DISEASE: ICD-10-CM

## 2022-10-13 NOTE — PROGRESS NOTES
GI CLINIC FOLLOW UP    INTERVAL HISTORY:   No referring provider defined for this encounter. Chief Complaint   Patient presents with    Crohn's Disease     Patient is f/u on colonoscopy. He states after the colonoscopy he had diarrhea for several days. He does c/o urgency. HISTORY OF PRESENT ILLNESS: Aleah Potts is a 60 y.o. male , referred for evaluation of  CD     Here for f/u    Seen 1 yr ago and did niot follow    Used to follow with Dr Jessica Black   diagnosed with Crohn's disease   ? Hx CD . years ago  Dr Jessica Black   ? Connection large and small bowel he was told    no medication was started on Mesalamine could not tolerate it and also has kid issues   No flare up symptoms   may be some diarrhea mildly   Last visit we ordered colonoscopy result s below    Not on any medication    3 BMs/day   Little loose    No blood   No cramps         The prep was good. Findings:   Terminal ileum: normal       Cecum/Ascending colon: normal       Transverse colon: normal       Descending/Sigmoid colon: normal       Rectum/Anus: examined in normal and retroflexed positions and was normal   Random biopsies were taken throughout the colon right colon jar #1   Transverse colon jar #2   Left colon jar #3   Rectosigmoid and jar #4         Withdrawal Time was (minutes): 10       The colon was decompressed and the scope was removed. The patient tolerated the procedure well. Recommendations/Plan:   1. Lifestyle and dietary modifications as discussed   2. F/U Biopsies   3. F/U In OfficeYes   4. Discussed with the family   5. Repeat colonoscopy [id number]       Electronically signed by Alyssa Marshall MD  on 9/12/2022 at 12:51 PM   -- Diagnosis --   A. RIGHT COLON, RANDOM BIOPSIES:   -CHRONIC COLITIS INJURY PATTERN WITH FOCAL MILD ACTIVE INFLAMMATION   -NEGATIVE FOR DYSPLASIA     B.   TRANSVERSE COLON, RANDOM BIOPSIES:   -CHRONIC COLITIS INJURY PATTERN   -NEGATIVE FOR DYSPLASIA     C.  LEFT COLON, RANDOM BIOPSIES: -CHRONIC COLITIS INJURY PATTERN WITH FOCAL MILD ACTIVE INFLAMMATION   -NEGATIVE FOR DYSPLASIA     D.  SIGMOID COLON, RANDOM BIOPSIES:   -CHRONIC COLITIS INJURY PATTERN WITH FOCAL MILD ACTIVE INFLAMMATION   -NEGATIVE FOR DYSPLASIA       Becky Coelho. Chela Viveros D.O.   **Electronically Signed Out**         ljf/9/13/2022       Past Medical,Family, and Social History reviewed and does contribute to the patient presentingcondition. Patient's PMH/PSH,SH,PSYCH Hx, MEDs, ALLERGIES, and ROS were all reviewed and updated in the appropriate sections.     PAST MEDICAL HISTORY:  Past Medical History:   Diagnosis Date    Anemia     iron deficiency    BPH (benign prostatic hyperplasia)     Cro[facility]'s disease ([facility]     Head injury 1982    Kidney cysts     Murmur        Past Surgical History:   Procedure Laterality Date    COLONOSCOPY      COLONOSCOPY      COLONOSCOPY N/A 9/12/2022    COLONOSCOPY WITH RANDOM RIGHT COLON BIOPSIES, TRANSVERSE COLON BIOPSIES, RANDOM LEFT COLON BIOPSIES AND SIGMOID COLON BIOPSIES performed by Shannan Giron MD at [address] [place] [address]    TONSILLECTOMY      VASECTOMY         CURRENT MEDICATIONS:    Current Outpatient Medications:     budesonide (ENTOCORT EC) 3 MG extended release capsule, Take 3 capsules by mouth every morning, Disp: 270 capsule, Rfl: 1    meloxicam (MOBIC) 15 MG tablet, , Disp: , Rfl:     ferrous sulfate 325 (65 Fe) MG tablet, Take 325 mg by mouth every other day, Disp: , Rfl:     Multiple Vitamins-Minerals (THERAPEUTIC VITAMINS/MINERALS PO), Take by mouth 1 every other day, Disp: , Rfl:     ASPIRIN PO, Take by mouth  (Patient not taking: Reported on 11/1/2021), Disp: , Rfl:     ALLERGIES:   Allergies   Allergen Reactions    Keflex [Cephalexin] Nausea Only    Prednisone Nausea Only       FAMILY HISTORY:       Problem Relation Age of Onset    Arthritis Mother     Stroke Mother     Arthritis Father     Heart Disease Father     Other Sister     Cancer Brother     Breast Cancer Paternal Grandmother     Diabetes Paternal Grandfather     Kidney Disease Maternal Uncle     Other Maternal Uncle     Other Maternal Grandfather          SOCIAL HISTORY:   Social History     Socioeconomic History    Marital status:      Spouse name: Not on file    Number of children: Not on file    Years of education: Not on file    Highest education level: Not on file   Occupational History    Not on file   Tobacco Use    Smoking status: Never    Smokeless tobacco: Never   Vaping Use    Vaping Use: Never used   Substance and Sexual Activity    Alcohol use: Yes     Comment: Occ    Drug use: No    Sexual activity: Not on file   Other Topics Concern    Not on file   Social History Narrative    Not on file     Social Determinants of Health     Financial Resource Strain: Not on file   Food Insecurity: Not on file   Transportation Needs: Not on file   Physical Activity: Not on file   Stress: Not on file   Social Connections: Not on file   Intimate Partner Violence: Not on file   Housing Stability: Not on file       REVIEW OF SYSTEMS: A 12-point review of systemswas obtained and pertinent positives and negatives were enumerated above in the history of present illness. All other reviewed systems / symptoms were negative. Review of Systems   Constitutional:  Negative for unexpected weight change. HENT:  Negative for sore throat and voice change. Eyes:  Negative for visual disturbance. Respiratory:  Negative for cough, shortness of breath and wheezing. Gastrointestinal:  Negative for abdominal distention, abdominal pain, anal bleeding, blood in stool, constipation, diarrhea (urgency), nausea, rectal pain and vomiting. Genitourinary:  Negative for difficulty urinating. Musculoskeletal:  Negative for back pain, joint swelling and neck stiffness. Skin:  Negative for color change, rash and wound. Neurological:  Negative for dizziness, weakness, light-headedness and headaches.    Hematological: Does not bruise/bleed easily. Psychiatric/Behavioral:  Negative for confusion, hallucinations and sleep disturbance. LABORATORY DATA: Reviewed  Lab Results   Component Value Date    WBC 6.0 11/26/2019    HGB 13.8 11/26/2019    HCT 41.5 11/26/2019    MCV 89.2 11/26/2019     11/26/2019     05/13/2019    K 4.4 05/13/2019     05/13/2019    CO2 29 05/13/2019    BUN 11 05/13/2019    CREATININE 1.08 05/13/2019    LABALBU 4.2 05/13/2019    BILITOT 0.26 (L) 05/13/2019    ALKPHOS 87 05/13/2019    AST 20 05/13/2019    ALT 13 05/13/2019         Lab Results   Component Value Date    RBC 4.66 11/26/2019    HGB 13.8 11/26/2019    MCV 89.2 11/26/2019    MCH 29.5 11/26/2019    MCHC 33.1 11/26/2019    RDW 14.4 11/26/2019    MPV 8.7 11/26/2019    BASOPCT 1 11/26/2019    LYMPHSABS 1.50 11/26/2019    MONOSABS 0.50 11/26/2019    NEUTROABS 3.90 11/26/2019    EOSABS 0.10 11/26/2019    BASOSABS 0.00 11/26/2019         DIAGNOSTIC TESTING:     No results found. PHYSICAL EXAMINATION: Vital signs reviewed per the nursing documentation. /77   Pulse 66   Temp 97.3 °F (36.3 °C)   Wt 153 lb (69.4 kg)   BMI 22.59 kg/m²   Body mass index is 22.59 kg/m². Physical Exam  Vitals and nursing note reviewed. Constitutional:       General: He is not in acute distress. Appearance: He is well-developed. He is not diaphoretic. HENT:      Head: Normocephalic and atraumatic. Eyes:      General: No scleral icterus. Pupils: Pupils are equal, round, and reactive to light. Neck:      Thyroid: No thyromegaly. Vascular: No JVD. Trachea: No tracheal deviation. Cardiovascular:      Rate and Rhythm: Normal rate and regular rhythm. Heart sounds: Normal heart sounds. No murmur heard. Pulmonary:      Effort: Pulmonary effort is normal. No respiratory distress. Breath sounds: Normal breath sounds. No wheezing. Abdominal:      General: Bowel sounds are normal. There is no distension. Palpations: Abdomen is soft. There is no mass. Tenderness: There is no abdominal tenderness. There is no guarding or rebound. Musculoskeletal:         General: No tenderness. Normal range of motion. Cervical back: Normal range of motion and neck supple. Skin:     General: Skin is warm. Coloration: Skin is not pale. Findings: No erythema or rash. Comments: He is not diaphoretic   Neurological:      Mental Status: He is alert and oriented to person, place, and time. Deep Tendon Reflexes: Reflexes are normal and symmetric. Psychiatric:         Behavior: Behavior normal.         Thought Content: Thought content normal.         Judgment: Judgment normal.         IMPRESSION: Mr. Joanne Rios is a 60 y.o. male with      Diagnosis Orders   1. Hx of Crohn's disease  Iron and TIBC    Vitamin B12    Folate    Vitamin D 25 Hydroxy        This patient persistently since 2018 does have colitis on his biopsies but endoscopically  No colitis  MR no enteritis  And inflammatory markers were normal  I given the offer of starting him on medication empirically with Entocort for 6 months  He is agreeable to do that  He cannot do 5-ASA because he gets side effects from them  Not want to put him on any biologicals with the negative MRCP and colonoscopy except when we biopsy as this patient disease seems to be microscopic  Clinical was also his not very symptomatic he has 2-3 bowel movements of loose stool no diarrhea no cramps no bleeding        Diet/life style/natural hx /complication of the dx were all explained in details   Past medical, past surgical, social history, psychiatric history, medications or allergies, all reviewed and  updated    Thank you for allowing me to participate in the care of Mr. Joanne Rios. For any further questions please do not hesitate to contact me. I have reviewed and agree with the ROS entered by the MA/RN. Note is dictated utilizing voice recognition software. Unfortunately this leads to occasional typographical errors. Please contact our office if you have any questions.       Benedicto Girard MD  [facility]  O: #[phone]

## 2022-10-17 ENCOUNTER — OFFICE VISIT (OUTPATIENT)
Dept: GASTROENTEROLOGY | Age: 62
End: 2022-10-17
Payer: COMMERCIAL

## 2022-10-17 ENCOUNTER — HOSPITAL ENCOUNTER (OUTPATIENT)
Age: 62
Setting detail: SPECIMEN
Discharge: HOME | End: 2022-10-17

## 2022-10-17 ENCOUNTER — TELEPHONE (OUTPATIENT)
Dept: SURGERY | Age: 62
End: 2022-10-17

## 2022-10-17 VITALS
DIASTOLIC BLOOD PRESSURE: 77 MMHG | HEART RATE: 66 BPM | WEIGHT: 153 LBS | TEMPERATURE: 97.3 F | SYSTOLIC BLOOD PRESSURE: 132 MMHG | BODY MASS INDEX: 22.59 KG/M2

## 2022-10-17 DIAGNOSIS — Z87.19 HX OF CROHN'S DISEASE: ICD-10-CM

## 2022-10-17 DIAGNOSIS — Z87.19 HX OF CROHN'S DISEASE: Primary | ICD-10-CM

## 2022-10-17 LAB
FOLATE: 10.2 NG/ML
IRON SATURATION: 20 % (ref 20–55)
IRON: 59 UG/DL (ref 59–158)
TOTAL IRON BINDING CAPACITY: 289 UG/DL (ref 250–450)
UNSATURATED IRON BINDING CAPACITY: 230 UG/DL (ref 112–347)
VITAMIN B-12: 857 PG/ML (ref 232–1245)
VITAMIN D 25-HYDROXY: 24.6 NG/ML

## 2022-10-17 PROCEDURE — 99214 OFFICE O/P EST MOD 30 MIN: CPT | Performed by: INTERNAL MEDICINE

## 2022-10-17 RX ORDER — BUDESONIDE 3 MG/1
9 CAPSULE, COATED PELLETS ORAL EVERY MORNING
Qty: 270 CAPSULE | Refills: 1 | Status: SHIPPED | OUTPATIENT
Start: 2022-10-17

## 2022-10-17 RX ORDER — BUDESONIDE 3 MG/1
9 CAPSULE, COATED PELLETS ORAL EVERY MORNING
Qty: 270 CAPSULE | Refills: 1 | Status: SHIPPED | OUTPATIENT
Start: 2022-10-17 | End: 2022-10-17 | Stop reason: SDUPTHER

## 2022-10-17 ASSESSMENT — ENCOUNTER SYMPTOMS
ANAL BLEEDING: 0
SORE THROAT: 0
COUGH: 0
NAUSEA: 0
BLOOD IN STOOL: 0
SHORTNESS OF BREATH: 0
CONSTIPATION: 0
ABDOMINAL PAIN: 0
COLOR CHANGE: 0
ABDOMINAL DISTENTION: 0
VOMITING: 0
VOICE CHANGE: 0
WHEEZING: 0
BACK PAIN: 0
DIARRHEA: 0
RECTAL PAIN: 0

## 2022-10-17 NOTE — TELEPHONE ENCOUNTER
Pt came back after checking out and states he needed a hard copy prescription for ENTOCORT EC instead of it being sent electronically. Please advise.

## 2023-02-20 ENCOUNTER — OFFICE VISIT (OUTPATIENT)
Dept: GASTROENTEROLOGY | Age: 63
End: 2023-02-20
Payer: COMMERCIAL

## 2023-02-20 VITALS
BODY MASS INDEX: 22.45 KG/M2 | SYSTOLIC BLOOD PRESSURE: 139 MMHG | HEART RATE: 65 BPM | DIASTOLIC BLOOD PRESSURE: 80 MMHG | WEIGHT: 152 LBS

## 2023-02-20 DIAGNOSIS — Z87.19 HX OF CROHN'S DISEASE: Primary | ICD-10-CM

## 2023-02-20 PROCEDURE — 99214 OFFICE O/P EST MOD 30 MIN: CPT | Performed by: INTERNAL MEDICINE

## 2023-02-20 ASSESSMENT — ENCOUNTER SYMPTOMS
ABDOMINAL PAIN: 0
NAUSEA: 0
ANAL BLEEDING: 0
BLOOD IN STOOL: 0
SHORTNESS OF BREATH: 0
BACK PAIN: 0
CONSTIPATION: 0
RECTAL PAIN: 0
SORE THROAT: 0
COLOR CHANGE: 0
VOMITING: 0
ABDOMINAL DISTENTION: 0
WHEEZING: 0
COUGH: 0
VOICE CHANGE: 0
DIARRHEA: 0

## 2023-02-20 NOTE — PROGRESS NOTES
GI CLINIC FOLLOW UP    INTERVAL HISTORY:   No referring provider defined for this encounter. Chief Complaint   Patient presents with    Crohn's Disease     Patient is f/u on labs and Crohn's. He states he is currently taking budesonide 6 mg and is happy with results. He reduced dose by himself. HISTORY OF PRESENT ILLNESS: Madison Mcguire is a 60 y.o. male , referred for evaluation of hx CD       Here for f/u   Patient changed to our service in October 22 for the first time before that he was following with Dr. Melina Muller  Was diagnosed years ago with Crohn's disease at the junction between the small and the large intestine he said  But he was not put on any medication  Before he came to me  And even with that he was not having any flareup  He was told he had Crohn's disease for years he said. Last visit I ordered anemia work-up and vitamin D on him  And because of the last colonoscopy had with Dr. Mike Stewart in 2018 showed no colitis but the biopsy showed inflammation  And because his IBD markers were negative the diagnosis was not clear  Nevertheless I started him empirically on Entocort  With that he is saying he is   Doing well, although he dropped himself to 6 mg a day instead of 9 mg. Today he is denied any cramps pain diarrhea or bleeding  His MRI MRCP and colonoscopy being negative     Last visit anemia w/u , Vit D     Normal B12/folate/Iron   Loq Vit D 24.6   Started on Vit D OTC          Past Medical,Family, and Social History reviewed and does contribute to the patient presentingcondition. Patient's PMH/PSH,SH,PSYCH Hx, MEDs, ALLERGIES, and ROS were all reviewed and updated in the appropriate sections.     PAST MEDICAL HISTORY:  Past Medical History:   Diagnosis Date    Anemia     iron deficiency    BPH (benign prostatic hyperplasia)     Crohn's disease ([facility] [address]     Head injury 1982    Kidney cysts     Murmur        Past Surgical History:   Procedure Laterality Date    COLONOSCOPY COLONOSCOPY      COLONOSCOPY N/A 9/12/2022    COLONOSCOPY WITH RANDOM RIGHT COLON BIOPSIES, TRANSVERSE COLON BIOPSIES, RANDOM LEFT COLON BIOPSIES AND SIGMOID COLON BIOPSIES performed by Gage Sierra MD at [address] [place] [address]      chest    TONSILLECTOMY      VASECTOMY         CURRENT MEDICATIONS:    Current Outpatient Medications:     budesonide (ENTOCORT EC) 3 MG extended release capsule, Take 3 capsules by mouth every morning (Patient taking differently: Take 6 mg by mouth every morning), Disp: 270 capsule, Rfl: 1    ferrous sulfate 325 (65 Fe) MG tablet, Take 325 mg by mouth every other day, Disp: , Rfl:     Multiple Vitamins-Minerals (THERAPEUTIC VITAMINS/MINERALS PO), Take by mouth 1 every other day, Disp: , Rfl:     ASPIRIN PO, Take by mouth  (Patient not taking: Reported on 11/1/2021), Disp: , Rfl:     ALLERGIES:   Allergies   Allergen Reactions    Keflex [Cephalexin] Nausea Only    Prednisone Nausea Only       FAMILY HISTORY:       Problem Relation Age of Onset    Arthritis Mother     Stroke Mother     Arthritis Father     Heart Disease Father     Other Sister     Cancer Brother     Breast Cancer Paternal Grandmother     Diabetes Paternal Grandfather     Kidney Disease Maternal Uncle     Other Maternal Uncle     Other Maternal Grandfather          SOCIAL HISTORY:   Social History     Socioeconomic History    Marital status:      Spouse name: Not on file    Number of children: Not on file    Years of education: Not on file    Highest education level: Not on file   Occupational History    Not on file   Tobacco Use    Smoking status: Never    Smokeless tobacco: Never   Vaping Use    Vaping Use: Never used   Substance and Sexual Activity    Alcohol use: Yes     Comment:  Occ    Drug use: No    Sexual activity: Not on file   Other Topics Concern    Not on file   Social History Narrative    Not on file     Social Determinants of Health     Financial Resource Strain: Not on file   Food Insecurity: Not on file   Transportation Needs: Not on file   Physical Activity: Not on file   Stress: Not on file   Social Connections: Not on file   Intimate Partner Violence: Not on file   Housing Stability: Not on file       REVIEW OF SYSTEMS: A 12-point review of systemswas obtained and pertinent positives and negatives were enumerated above in the history of present illness. All other reviewed systems / symptoms were negative. Review of Systems   Constitutional:  Negative for unexpected weight change. HENT:  Negative for sore throat and voice change. Eyes:  Negative for visual disturbance (glasses). Respiratory:  Negative for cough, shortness of breath and wheezing. Gastrointestinal:  Negative for abdominal distention, abdominal pain, anal bleeding, blood in stool, constipation, diarrhea, nausea, rectal pain and vomiting. Genitourinary:  Negative for difficulty urinating. Musculoskeletal:  Negative for back pain, joint swelling and neck stiffness. Skin:  Negative for color change, rash and wound. Neurological:  Negative for dizziness, weakness, light-headedness and headaches. Hematological:  Does not bruise/bleed easily. Psychiatric/Behavioral:  Negative for confusion, hallucinations and sleep disturbance.           LABORATORY DATA: Reviewed  Lab Results   Component Value Date    WBC 6.0 11/26/2019    HGB 13.8 11/26/2019    HCT 41.5 11/26/2019    MCV 89.2 11/26/2019     11/26/2019     05/13/2019    K 4.4 05/13/2019     05/13/2019    CO2 29 05/13/2019    BUN 11 05/13/2019    CREATININE 1.08 05/13/2019    LABALBU 4.2 05/13/2019    BILITOT 0.26 (L) 05/13/2019    ALKPHOS 87 05/13/2019    AST 20 05/13/2019    ALT 13 05/13/2019         Lab Results   Component Value Date    RBC 4.66 11/26/2019    HGB 13.8 11/26/2019    MCV 89.2 11/26/2019    MCH 29.5 11/26/2019    MCHC 33.1 11/26/2019    RDW 14.4 11/26/2019    MPV 8.7 11/26/2019    BASOPCT 1 11/26/2019    LYMPHSABS 1.50 11/26/2019    MONOSABS 0.50 11/26/2019    NEUTROABS 3.90 11/26/2019    EOSABS 0.10 11/26/2019    BASOSABS 0.00 11/26/2019         DIAGNOSTIC TESTING:     No results found. PHYSICAL EXAMINATION: Vital signs reviewed per the nursing documentation. /80   Pulse 65   Wt 152 lb (68.9 kg)   BMI 22.45 kg/m²   Body mass index is 22.45 kg/m². Physical Exam  Vitals and nursing note reviewed. Constitutional:       General: He is not in acute distress. Appearance: He is well-developed. He is not diaphoretic. HENT:      Head: Normocephalic and atraumatic. Eyes:      General: No scleral icterus. Pupils: Pupils are equal, round, and reactive to light. Neck:      Thyroid: No thyromegaly. Vascular: No JVD. Trachea: No tracheal deviation. Cardiovascular:      Rate and Rhythm: Normal rate and regular rhythm. Heart sounds: Normal heart sounds. No murmur heard. Pulmonary:      Effort: Pulmonary effort is normal. No respiratory distress. Breath sounds: Normal breath sounds. No wheezing. Abdominal:      General: Bowel sounds are normal. There is no distension. Palpations: Abdomen is soft. There is no mass. Tenderness: There is no abdominal tenderness. There is no guarding or rebound. Musculoskeletal:         General: No tenderness. Normal range of motion. Cervical back: Normal range of motion and neck supple. Skin:     General: Skin is warm. Coloration: Skin is not pale. Findings: No erythema or rash. Comments: He is not diaphoretic   Neurological:      Mental Status: He is alert and oriented to person, place, and time. Deep Tendon Reflexes: Reflexes are normal and symmetric. Psychiatric:         Behavior: Behavior normal.         Thought Content: Thought content normal.         Judgment: Judgment normal.         IMPRESSION: Mr. Tammy Coto is a 60 y.o. male with      Diagnosis Orders   1.  Hx of Crohn's disease          Patient does not want a lot of testing he is not wanting to repeat the colonoscopy at this time is doing very well on the 6 mg of Entocort a day we will continue with that  In few months stop it and see how he does  He might need a second look and more biopsies and explained that to him    Diet/life style/natural hx /complication of the dx were all explained in details   Past medical, past surgical, social history, psychiatric history, medications or allergies, all reviewed and  updated    Thank you for allowing me to participate in the care of Mr. Andrews Feldman. For any further questions please do not hesitate to contact me. I have reviewed and agree with the ROS entered by the MA/RN. Note is dictated utilizing voice recognition software. Unfortunately this leads to occasional typographical errors. Please contact our office if you have any questions.       Jack Castillo MD  [facility]  O: #[phone]

## 2023-08-21 ENCOUNTER — OFFICE VISIT (OUTPATIENT)
Dept: GASTROENTEROLOGY | Age: 63
End: 2023-08-21
Payer: COMMERCIAL

## 2023-08-21 VITALS
BODY MASS INDEX: 22 KG/M2 | WEIGHT: 149 LBS | SYSTOLIC BLOOD PRESSURE: 132 MMHG | TEMPERATURE: 97.5 F | DIASTOLIC BLOOD PRESSURE: 80 MMHG

## 2023-08-21 DIAGNOSIS — Z87.19 HX OF CROHN'S DISEASE: Primary | ICD-10-CM

## 2023-08-21 PROCEDURE — 99214 OFFICE O/P EST MOD 30 MIN: CPT | Performed by: INTERNAL MEDICINE

## 2023-08-21 ASSESSMENT — ENCOUNTER SYMPTOMS
ABDOMINAL DISTENTION: 1
COLOR CHANGE: 0
NAUSEA: 0
ABDOMINAL PAIN: 0
ANAL BLEEDING: 0
CHOKING: 0
WHEEZING: 0
DIARRHEA: 0
TROUBLE SWALLOWING: 0
VOMITING: 0
RECTAL PAIN: 0
COUGH: 0
BLOOD IN STOOL: 0
SORE THROAT: 0
CONSTIPATION: 0
SHORTNESS OF BREATH: 0

## 2023-08-21 NOTE — PROGRESS NOTES
GI CLINIC FOLLOW UP    INTERVAL HISTORY:   No referring provider defined for this encounter. Chief Complaint   Patient presents with    GI Problem     Pt is here today for a 6 month f/u last seen for Hx of crohns disease. HISTORY OF PRESENT ILLNESS: Duc Buckner is a 58 y.o. male , referred for evaluation of  hx CD         Here for f/u   Was diagnosed years ago with Crohn's disease at the junction between the small and the large intestine he said  But he was not put on any medication  Before he came to me  And even with that he was not having any flareup  He was told he had Crohn's disease for years he said. On entocort  6 mg /day   Refused testing   In July muscle cramps in legs , stopped Entocort for 2 weeks then went back to one tab 3 mg/day    And that what he is been taking  No flare up  Most recent colonoscopy 9/2022  All negative   No labs since 2019    Had labs at  [facility] with PCP , was told all good       Past Medical,Family, and Social History reviewed and does contribute to the patient presentingcondition. I did review all the labs results available for the labs which were ordered by the primary care physician, and the other consultants, we search on [place] at [address] and all the available care everywhere epic    I did review all the imaging studies of the abdomen available on EMR, ordered by the primary care physician and the other consultant    I did review all the pathology from the biopsies done on the previous endoscopies    Patient's PMH/PSH,SH,PSYCH Hx, MEDs, ALLERGIES, and ROS were all reviewed and updated in the appropriate sections.     PAST MEDICAL HISTORY:  Past Medical History:   Diagnosis Date    Anemia     iron deficiency    BPH (benign prostatic hyperplasia)     Crohn's disease ([address])     Head injury 1982    Kidney cysts     Murmur        Past Surgical History:   Procedure Laterality Date    COLONOSCOPY      COLONOSCOPY      COLONOSCOPY N/A 9/12/2022    COLONOSCOPY WITH

## 2025-07-01 ENCOUNTER — TRANSCRIBE ORDERS (OUTPATIENT)
Dept: ADMINISTRATIVE | Age: 65
End: 2025-07-01

## 2025-07-01 DIAGNOSIS — N28.1 CYST OF KIDNEY, ACQUIRED: Primary | ICD-10-CM

## 2025-07-08 ENCOUNTER — HOSPITAL ENCOUNTER (OUTPATIENT)
Dept: ULTRASOUND IMAGING | Age: 65
Discharge: HOME | End: 2025-07-10
Attending: UROLOGY
Payer: COMMERCIAL

## 2025-07-08 DIAGNOSIS — N28.1 CYST OF KIDNEY, ACQUIRED: ICD-10-CM

## 2025-07-08 PROCEDURE — 76775 US EXAM ABDO BACK WALL LIM: CPT

## (undated) DEVICE — PERRYSBURG ENDO PACK: Brand: MEDLINE INDUSTRIES, INC.

## (undated) DEVICE — FORCEPS BX L240CM JAW DIA2.8MM L CAP W/ NDL MIC MESH TOOTH

## (undated) DEVICE — Device: Brand: DEFENDO VALVE AND CONNECTOR KIT

## (undated) DEVICE — GLOVE ORANGE PI 7 1/2   MSG9075